# Patient Record
Sex: MALE | Race: WHITE | NOT HISPANIC OR LATINO | Employment: FULL TIME | URBAN - METROPOLITAN AREA
[De-identification: names, ages, dates, MRNs, and addresses within clinical notes are randomized per-mention and may not be internally consistent; named-entity substitution may affect disease eponyms.]

---

## 2017-01-06 ENCOUNTER — TELEPHONE (OUTPATIENT)
Dept: FAMILY MEDICINE CLINIC | Facility: CLINIC | Age: 37
End: 2017-01-06

## 2017-01-06 NOTE — TELEPHONE ENCOUNTER
ATTEMPTED TO CONTACT PT AND WIFE WITH NO ANSWER AND NO VM SET UP I FOUND OUT WHAT I NEEDED AND PAPER WORK WAS PLACED ON DR EVANS DESK TO COMPLETE SO WE CAN FAX BACK INFO.

## 2017-01-06 NOTE — TELEPHONE ENCOUNTER
----- Message from Luna Galo sent at 1/6/2017 11:25 AM EST -----  Contact: Whitfield  Patient's wife called and stated that someone is trying to call him but he is on the line at [facility]. Please contact wife at [phone].

## 2017-01-27 ENCOUNTER — TELEPHONE (OUTPATIENT)
Dept: FAMILY MEDICINE CLINIC | Facility: CLINIC | Age: 37
End: 2017-01-27

## 2017-01-27 NOTE — TELEPHONE ENCOUNTER
----- Message from Jacque Hermosillo sent at 1/27/2017  2:11 PM EST -----  Contact: EVANS WIFE RUY  WIFE CALLED TO ADVISE THAT HE HAS BEEN ON THE NEW INSULIN PUMP AND IT IS WORKING FINE AND HE IS FEELING BETTER HE IS GETTING BETTER AIC  WILL NEED A NEW SCRIPT FOR INSULIN SUPPLEMENTS FOR THE PUMP  PLEASE CALL WIFE RUY  [phone]

## 2017-01-27 NOTE — TELEPHONE ENCOUNTER
PLease call and find out which insulin he needs. Humalog vials or Novolog? bds  How much insulin does the new pump hold and how often does he have to fill it? bds

## 2017-01-27 NOTE — TELEPHONE ENCOUNTER
"Spoke with pt's wife who states the \"insulin\" nurse is sending out a new cartridge for pt for his new pump. States it has changed completely and they don't want Dr Whitfield to get confused later if they need something. Pt's wife is asking for a f/u appt with Dr Whitfield. Call transferred to front to schedule f/u appt.  "

## 2017-02-01 ENCOUNTER — TELEPHONE (OUTPATIENT)
Dept: FAMILY MEDICINE CLINIC | Facility: CLINIC | Age: 37
End: 2017-02-01

## 2017-02-01 NOTE — TELEPHONE ENCOUNTER
----- Message from Teresa Arriaza sent at 2/1/2017  3:37 PM EST -----  Contact: DR KENNY MED REFILL  PATIENT IS DR MOREL PATIENT AND HE HAS RAN OUT OF HIS TEST STRIPS. PATIENT WIFE IS REQUESTING THAT YOU SEND THEM ( ONE TOUCH VERIO TEST STRIPS) INTO [facility] IN [place] .  [phone]

## 2017-02-02 ENCOUNTER — TELEPHONE (OUTPATIENT)
Dept: FAMILY MEDICINE CLINIC | Facility: CLINIC | Age: 37
End: 2017-02-02

## 2017-02-02 NOTE — TELEPHONE ENCOUNTER
----- Message from Krys Forrest sent at 2/2/2017 10:59 AM EST -----  Contact: HELEN EVANS IS GOING TO GET A FORM FROM A PLACE FOR MEDICAL SUPPLIES FOR TEST STRIPS. SHE WASN'T SURE OF THE NAME.  THE INSURANCE WILL NOT PAY IF CALLED INTO A PHARMACY   SO THEY HAVE TO BE DONE THROUGH THIS COMPANY BECAUSE IT IS RELATED TO THE DIAB. PUMP      CALL WIFE FOR COMPLETE EXPLANATION.    [phone]

## 2017-02-06 ENCOUNTER — OFFICE VISIT (OUTPATIENT)
Dept: FAMILY MEDICINE CLINIC | Facility: CLINIC | Age: 37
End: 2017-02-06

## 2017-02-06 VITALS
RESPIRATION RATE: 16 BRPM | BODY MASS INDEX: 27.93 KG/M2 | HEART RATE: 94 BPM | OXYGEN SATURATION: 99 % | DIASTOLIC BLOOD PRESSURE: 88 MMHG | TEMPERATURE: 98 F | HEIGHT: 71 IN | SYSTOLIC BLOOD PRESSURE: 124 MMHG | WEIGHT: 199.5 LBS

## 2017-02-06 DIAGNOSIS — E10.65 UNCONTROLLED TYPE 1 DIABETES MELLITUS WITH HYPERGLYCEMIA (HCC): Primary | ICD-10-CM

## 2017-02-06 PROCEDURE — 99213 OFFICE O/P EST LOW 20 MIN: CPT | Performed by: FAMILY MEDICINE

## 2017-02-06 NOTE — PROGRESS NOTES
Chief Complaint   Patient presents with   • Diabetes   • Med Refill       Subjective     Diabetes   He presents for his follow-up diabetic visit. He has type 1 diabetes mellitus. His disease course has been stable. There are no hypoglycemic associated symptoms. Pertinent negatives for diabetes include no blurred vision, no chest pain, no fatigue, no visual change and no weight loss. There are no hypoglycemic complications. Symptoms are improving. There are no diabetic complications. Risk factors for coronary artery disease include dyslipidemia. Current diabetic treatment includes insulin pump. He is compliant with treatment all of the time. His weight is stable. He is following a generally healthy diet. Frequency home blood tests: Has continuous glucose. (Reviewed readings from continuous glucose monitor.  Has had only for the last 2 weeks.  Blood sugars ranging up to 300 but overall average around 190-200.) An ACE inhibitor/angiotensin II receptor blocker is not being taken. Eye exam is not current.       DM I, insulin pump    Has new pump and CGM    BS running: ave 192 with elevations middle of night, occ low    Basal Rate: 1.55 per hour 24 hour period.     CGM calibrates 2 times per day    Need strips.   One touch verio.         Past Medical History,Medications, Allergies, and social history was reviewed.    Review of Systems   Constitutional: Negative.  Negative for fatigue and weight loss.   HENT: Negative.    Eyes: Negative.  Negative for blurred vision.   Respiratory: Negative.    Cardiovascular: Negative.  Negative for chest pain.   Gastrointestinal: Negative.    Endocrine: Negative.    Genitourinary: Negative.    Musculoskeletal: Negative.    Neurological: Negative.    Psychiatric/Behavioral: Negative.        Objective     Physical Exam   Constitutional: He is oriented to person, place, and time. He appears well-developed and well-nourished.   HENT:   Head: Normocephalic and atraumatic.   Right Ear:  External ear normal.   Left Ear: External ear normal.   Eyes: EOM are normal. Pupils are equal, round, and reactive to light.   Pulmonary/Chest: Effort normal.   Neurological: He is alert and oriented to person, place, and time.   Skin: Skin is warm and dry.   Psychiatric: He has a normal mood and affect. His behavior is normal.   Nursing note and vitals reviewed.    Insulin pump is in place on right side of abdomen and he continues glucose monitor on the left abdomen    Assessment/Plan     Problem List Items Addressed This Visit        Endocrine    Uncontrolled type 1 diabetes mellitus - Primary              DISCUSSION  Cont insulin pump and continuous glucose monitoring.  In 2 weeks, will review further and see if there are patterns of how high he is.  Right now he is using 1.55 units of basal insulin per hour.  I suspect we will need to adjust that but we will get him more attuned to the current regimen and change from there.    He is agreeable.    REFILLED TEST STRIPS TO CHECK 3 TIMES A DAY #200 FAX to 1-3 3 0-4 8 7-1 410    MEDICATIONS PRESCRIBED  Requested Prescriptions      No prescriptions requested or ordered in this encounter          Carlitos Whitfield MD

## 2017-02-21 ENCOUNTER — TELEPHONE (OUTPATIENT)
Dept: FAMILY MEDICINE CLINIC | Facility: CLINIC | Age: 37
End: 2017-02-21

## 2017-02-21 NOTE — TELEPHONE ENCOUNTER
Please call.  Given the increased blood sugars during the night, recommend increasing the basal rate to 1.6 from 9 PM until 7 AM and then resume the 1.55 between 7 AM and 9 PM.  Of course, continue to monitor blood sugar.  If any low sugars, please let me know and I will check again in 1 month for levels.  Call sooner though if it is not decreasing much.

## 2017-03-27 ENCOUNTER — TELEPHONE (OUTPATIENT)
Dept: FAMILY MEDICINE CLINIC | Facility: CLINIC | Age: 37
End: 2017-03-27

## 2017-03-27 NOTE — TELEPHONE ENCOUNTER
Please call patient.  I reviewed his blood sugars from February 21 to March 27.  It appears that he is having highs consistently between 8 PM until 2 AM.  I would like for him to start the basal rate of 1.6 at 8 PM instead of 9 PM continue until 7 AM.  Then 1.55 basal rate 7 AM until 8 PM during the day.  On average, his blood sugar has decreased from 212-192 so looking better.  If he is having any significant lows in the middle the night, please let me know.  It appears he had a few on reviewed.  Let me know how he is doing.

## 2017-03-29 NOTE — TELEPHONE ENCOUNTER
LVM FOR PT TO RETURN CALL. OFFICE HRS AND NUMBER GIVEN.   LVM FOR WIFE TO RETURN CALL, OFFICE HRS AND NUMBER GIVEN.

## 2017-03-30 ENCOUNTER — TELEPHONE (OUTPATIENT)
Dept: FAMILY MEDICINE CLINIC | Facility: CLINIC | Age: 37
End: 2017-03-30

## 2017-03-30 NOTE — TELEPHONE ENCOUNTER
SPOKE WITH WIFE AND INFORMED HER OF MESSAGE AND WIFE STATES THAT PT CENSORS WERE FAULTY AND THEY HAD TO SEND NEW ONES. THE LOW READINGS YOU WERE SEEING WERE NOT ACCURATE AND WIFE VERBALIZED UNDERSTANDING.

## 2017-03-30 NOTE — TELEPHONE ENCOUNTER
----- Message from Manda Alvarez sent at 3/29/2017  1:11 PM EDT -----  Contact: DR. EVANS; SHIV Frye wife turned your call regarding kolton cedeno. His sensors has been messed up and the company has shipped him some new one.       CALL BACK   [phone]

## 2017-04-21 ENCOUNTER — TELEPHONE (OUTPATIENT)
Dept: FAMILY MEDICINE CLINIC | Facility: CLINIC | Age: 37
End: 2017-04-21

## 2017-04-21 NOTE — TELEPHONE ENCOUNTER
LVM FOR PT THAT I SPOKE WITH PHARM AND THEY STATE THAT NOVA LOG IS COVERED HE JUST HAS A HIGH CO PAY ON IT. THEY SAID FOR PT TO CALL INS. AND SEE IF THERE WAS ANOTHER ONE CHEAPER. SPOKE WITH WIFE AND INFORMED HER OF THE SAME THING AND THAT I HAVE A COUPLE SAMPLES AND HE OR SHE CAN PU BEFORE 5 PM TODAY. WIFE VERBALIZED UNDERSTANDING.

## 2017-04-21 NOTE — TELEPHONE ENCOUNTER
----- Message from Jacque Hermosillo sent at 4/20/2017  2:31 PM EDT -----  Contact: MIKE SHAW  PATIENT IS NEEDING A SAMPLE OF THE NOVALOG HE CANNOT AFFORD TO SCRIPT PLEASE CALL HIM   [phone]

## 2017-04-21 NOTE — TELEPHONE ENCOUNTER
PLease call, not sure if we have samples. ? insurance not covering. Ok for samples if we have but it is for his pump. Please clarify with pharmacy as well on coverage. bds

## 2017-05-01 ENCOUNTER — TELEPHONE (OUTPATIENT)
Dept: FAMILY MEDICINE CLINIC | Facility: CLINIC | Age: 37
End: 2017-05-01

## 2017-05-02 ENCOUNTER — TELEPHONE (OUTPATIENT)
Dept: FAMILY MEDICINE CLINIC | Facility: CLINIC | Age: 37
End: 2017-05-02

## 2017-06-08 ENCOUNTER — OFFICE VISIT (OUTPATIENT)
Dept: FAMILY MEDICINE CLINIC | Facility: CLINIC | Age: 37
End: 2017-06-08

## 2017-06-08 VITALS
WEIGHT: 206 LBS | HEIGHT: 71 IN | HEART RATE: 102 BPM | TEMPERATURE: 97.8 F | BODY MASS INDEX: 28.84 KG/M2 | DIASTOLIC BLOOD PRESSURE: 84 MMHG | OXYGEN SATURATION: 98 % | RESPIRATION RATE: 16 BRPM | SYSTOLIC BLOOD PRESSURE: 118 MMHG

## 2017-06-08 DIAGNOSIS — E10.65 UNCONTROLLED TYPE 1 DIABETES MELLITUS WITH HYPERGLYCEMIA (HCC): Primary | ICD-10-CM

## 2017-06-08 DIAGNOSIS — E78.2 MIXED HYPERLIPIDEMIA: ICD-10-CM

## 2017-06-08 PROCEDURE — 99213 OFFICE O/P EST LOW 20 MIN: CPT | Performed by: FAMILY MEDICINE

## 2017-06-08 NOTE — PROGRESS NOTES
"  Chief Complaint   Patient presents with   • Diabetes   • Labs Only   • Med Refill       Subjective     Diabetes   He presents for his follow-up diabetic visit. He has type 1 diabetes mellitus. His disease course has been stable. There are no hypoglycemic associated symptoms. Associated symptoms include fatigue. Pertinent negatives for diabetes include no blurred vision, no chest pain, no visual change and no weight loss. There are no hypoglycemic complications. Symptoms are improving. There are no diabetic complications. Risk factors for coronary artery disease include dyslipidemia. Current diabetic treatment includes insulin pump. He is compliant with treatment all of the time. His weight is stable. He is following a generally healthy diet. Frequency home blood tests: Has continuous glucose. (Occ get low. 230 range. 40s after work every day. Snacks during breaks) An ACE inhibitor/angiotensin II receptor blocker is not being taken. Eye exam is not current.       Past Medical History,Medications, Allergies, and social history was reviewed.    Review of Systems   Constitutional: Positive for fatigue. Negative for fever and weight loss.   HENT: Negative.    Eyes: Negative for blurred vision.   Respiratory: Negative.    Cardiovascular: Negative.  Negative for chest pain.   Gastrointestinal: Negative.    Genitourinary: Negative.    Musculoskeletal: Negative.    Neurological: Negative.    Psychiatric/Behavioral: Negative.        Objective     Vitals:    06/08/17 1618   BP: 118/84   Pulse: 102   Resp: 16   Temp: 97.8 °F (36.6 °C)   TempSrc: Temporal Artery    SpO2: 98%   Weight: 206 lb (93.4 kg)   Height: 71\" (180.3 cm)        Physical Exam   Constitutional: He is oriented to person, place, and time. He appears well-developed and well-nourished.   HENT:   Head: Normocephalic and atraumatic.   Right Ear: External ear normal.   Left Ear: External ear normal.   Eyes: EOM are normal. Pupils are equal, round, and reactive to " light.   Cardiovascular: Normal rate, regular rhythm and normal heart sounds.    Pulmonary/Chest: Effort normal and breath sounds normal. No respiratory distress. He has no wheezes. He has no rales.   Musculoskeletal: He exhibits no edema.   Neurological: He is alert and oriented to person, place, and time.   Skin: Skin is warm and dry.   Psychiatric: He has a normal mood and affect. His behavior is normal.   Nursing note and vitals reviewed.        Assessment/Plan     Problem List Items Addressed This Visit        Cardiovascular and Mediastinum    Hyperlipidemia    Relevant Orders    Lipid Panel With / Chol / HDL Ratio       Endocrine    Uncontrolled type 1 diabetes mellitus - Primary    Relevant Orders    Comprehensive Metabolic Panel    Hemoglobin A1c              DISCUSSION  Check labs as noted for follow-up of diabetes and hyperlipidemia.  We will check his readings on line for his insulin pump to see if we need to changing further.  He has been having some low blood sugars after work.    Reviewed his readings.  Average blood sugar 195.  He did have some low blood sugars.  We will see what A1c is and then make further adjustments.    Further plan once we have labs back.        MEDICATIONS PRESCRIBED  Requested Prescriptions      No prescriptions requested or ordered in this encounter          Carlitos Whitfield MD

## 2017-06-09 LAB
ALBUMIN SERPL-MCNC: 4.5 G/DL (ref 3.2–4.8)
ALBUMIN/GLOB SERPL: 1.5 G/DL (ref 1.5–2.5)
ALP SERPL-CCNC: 87 U/L (ref 25–100)
ALT SERPL-CCNC: 17 U/L (ref 7–40)
AST SERPL-CCNC: 16 U/L (ref 0–33)
BILIRUB SERPL-MCNC: 0.4 MG/DL (ref 0.3–1.2)
BUN SERPL-MCNC: 18 MG/DL (ref 9–23)
BUN/CREAT SERPL: 20 (ref 7–25)
CALCIUM SERPL-MCNC: 10.3 MG/DL (ref 8.7–10.4)
CHLORIDE SERPL-SCNC: 105 MMOL/L (ref 99–109)
CHOLEST SERPL-MCNC: 196 MG/DL (ref 0–200)
CHOLEST/HDLC SERPL: 4.67 {RATIO}
CO2 SERPL-SCNC: 27 MMOL/L (ref 20–31)
CREAT SERPL-MCNC: 0.9 MG/DL (ref 0.6–1.3)
GLOBULIN SER CALC-MCNC: 3 GM/DL
GLUCOSE SERPL-MCNC: 176 MG/DL (ref 70–100)
HBA1C MFR BLD: 7.5 % (ref 4.8–5.6)
HDLC SERPL-MCNC: 42 MG/DL (ref 40–60)
LDLC SERPL CALC-MCNC: 129 MG/DL (ref 0–100)
POTASSIUM SERPL-SCNC: 4.6 MMOL/L (ref 3.5–5.5)
PROT SERPL-MCNC: 7.5 G/DL (ref 5.7–8.2)
SODIUM SERPL-SCNC: 139 MMOL/L (ref 132–146)
TRIGL SERPL-MCNC: 125 MG/DL (ref 0–150)
VLDLC SERPL CALC-MCNC: 25 MG/DL

## 2017-11-28 ENCOUNTER — TELEPHONE (OUTPATIENT)
Dept: FAMILY MEDICINE CLINIC | Facility: CLINIC | Age: 37
End: 2017-11-28

## 2017-11-28 NOTE — TELEPHONE ENCOUNTER
Please call patient. Need to know how much Novolog insulin he is doing per day now. How many units on average and max amount.     Also, due for office visit and labs.     Carlitos Whitfield MD

## 2017-11-28 NOTE — TELEPHONE ENCOUNTER
Spoke with pt he was unsure he just knows his pumps holds 300u and he changes it out every three days. He is aware he needs an o/v

## 2017-11-28 NOTE — TELEPHONE ENCOUNTER
[facility] FROM  PHARM CALLING AND THEY ARE BEING AUDITED AND NEED A NEW RX WITH SPECIFIC INSTRUCTIONS ON NOVA LOG FROM 7/18/2016 ON LETTER HEAD OR ON A RX THEY WANT PHYSICIAN TO ADD A MAX DAILY DOSE ON THIS AS WELL.  FAX [phone]

## 2017-12-12 ENCOUNTER — OFFICE VISIT (OUTPATIENT)
Dept: FAMILY MEDICINE CLINIC | Facility: CLINIC | Age: 37
End: 2017-12-12

## 2017-12-12 VITALS
HEART RATE: 94 BPM | RESPIRATION RATE: 16 BRPM | TEMPERATURE: 97.8 F | BODY MASS INDEX: 33.32 KG/M2 | DIASTOLIC BLOOD PRESSURE: 98 MMHG | WEIGHT: 238 LBS | HEIGHT: 71 IN | SYSTOLIC BLOOD PRESSURE: 120 MMHG | OXYGEN SATURATION: 98 %

## 2017-12-12 DIAGNOSIS — E78.2 MIXED HYPERLIPIDEMIA: ICD-10-CM

## 2017-12-12 DIAGNOSIS — E10.65 UNCONTROLLED TYPE 1 DIABETES MELLITUS WITH HYPERGLYCEMIA (HCC): Primary | ICD-10-CM

## 2017-12-12 LAB
ALBUMIN SERPL-MCNC: 4.4 G/DL (ref 3.2–4.8)
ALBUMIN/GLOB SERPL: 1.6 G/DL (ref 1.5–2.5)
ALP SERPL-CCNC: 98 U/L (ref 25–100)
ALT SERPL-CCNC: 28 U/L (ref 7–40)
AST SERPL-CCNC: 22 U/L (ref 0–33)
BILIRUB SERPL-MCNC: 0.4 MG/DL (ref 0.3–1.2)
BUN SERPL-MCNC: 15 MG/DL (ref 9–23)
BUN/CREAT SERPL: 16.7 (ref 7–25)
CALCIUM SERPL-MCNC: 9.5 MG/DL (ref 8.7–10.4)
CHLORIDE SERPL-SCNC: 102 MMOL/L (ref 99–109)
CHOLEST SERPL-MCNC: 187 MG/DL (ref 0–200)
CHOLEST/HDLC SERPL: 4.79 {RATIO}
CO2 SERPL-SCNC: 28 MMOL/L (ref 20–31)
CREAT SERPL-MCNC: 0.9 MG/DL (ref 0.6–1.3)
GFR SERPLBLD CREATININE-BSD FMLA CKD-EPI: 115 ML/MIN/1.73
GFR SERPLBLD CREATININE-BSD FMLA CKD-EPI: 95 ML/MIN/1.73
GLOBULIN SER CALC-MCNC: 2.7 GM/DL
GLUCOSE SERPL-MCNC: 162 MG/DL (ref 70–100)
HBA1C MFR BLD: 9 % (ref 4.8–5.6)
HDLC SERPL-MCNC: 39 MG/DL (ref 40–60)
LDLC SERPL CALC-MCNC: 123 MG/DL (ref 0–100)
POTASSIUM SERPL-SCNC: 4.1 MMOL/L (ref 3.5–5.5)
PROT SERPL-MCNC: 7.1 G/DL (ref 5.7–8.2)
SODIUM SERPL-SCNC: 136 MMOL/L (ref 132–146)
TRIGL SERPL-MCNC: 126 MG/DL (ref 0–150)
VLDLC SERPL CALC-MCNC: 25.2 MG/DL

## 2017-12-12 PROCEDURE — 99213 OFFICE O/P EST LOW 20 MIN: CPT | Performed by: FAMILY MEDICINE

## 2018-01-11 ENCOUNTER — TELEPHONE (OUTPATIENT)
Dept: FAMILY MEDICINE CLINIC | Facility: CLINIC | Age: 38
End: 2018-01-11

## 2018-01-11 DIAGNOSIS — Z20.828 EXPOSURE TO THE FLU: Primary | ICD-10-CM

## 2018-01-11 RX ORDER — OSELTAMIVIR PHOSPHATE 75 MG/1
75 CAPSULE ORAL DAILY
Qty: 10 CAPSULE | Refills: 0 | Status: SHIPPED | OUTPATIENT
Start: 2018-01-11 | End: 2018-02-27

## 2018-01-11 NOTE — TELEPHONE ENCOUNTER
----- Message from Thalia Gonzalez sent at 1/11/2018 12:14 PM EST -----  Contact: rosi, patient wife  Pt son was diagnosed with Flu B, should Robert get Tamiflu since he is diabetic or wait? Robert has also not gotten the flu shot is it too late? [facility] [place], [phone] may leave a message

## 2018-01-11 NOTE — TELEPHONE ENCOUNTER
Please call,    1. Never too late to get flu shot but we are out of them so would need to get at pharmacy.    2. Yes, would rec Tamiflu and I will send in. Takes once per day for 10 days unless gets ill then one twice per day for 5 days

## 2018-01-11 NOTE — TELEPHONE ENCOUNTER
LVM FOR PT ON PHONE AND SPOKE WITH WIFE AND INFORMED HER OF THIS AND WIFE VERBALIZED UNDERSTANDING.

## 2018-02-19 ENCOUNTER — TELEPHONE (OUTPATIENT)
Dept: FAMILY MEDICINE CLINIC | Facility: CLINIC | Age: 38
End: 2018-02-19

## 2018-02-19 NOTE — TELEPHONE ENCOUNTER
----- Message from Manda Alvarez sent at 2/19/2018  3:15 PM EST -----  Contact: NATHAN; [facility] QUESTION/ MED QUESTION  PT FATHER WAS PASSING AWAY AND THEY HAD CALLED THE FAMILY IN AND THEY WANTED TO KNOW IF DR. EVANS WOULD BE ABLE TO FRILL OUT THE [facility] PAPER WORK.     SHE ALSO WANTED TO KNOW IF A PERSONS BODY CAN BE COME ADJUSTED TO THE INSULIN.     CALL BACK   [phone]

## 2018-02-19 NOTE — TELEPHONE ENCOUNTER
Okay for [place] paperwork but just need to have dates involved that he has missed.    The question about the insulin is difficult to answer there are lots of other factors including stress, diet changes, that may affect the insulin.

## 2018-02-21 NOTE — TELEPHONE ENCOUNTER
LVM FOR RETURN CALL,OFFICE HRS AND NUMBER GIVEN. SPOKE WITH PT WIFE AND INFORMED HER OF MESSAGE AND SHE WILL HAVE PT WRITE DOWN EVERY THING DR NEEDS AND BRING IT IN TO YOU

## 2018-02-27 ENCOUNTER — OFFICE VISIT (OUTPATIENT)
Dept: FAMILY MEDICINE CLINIC | Facility: CLINIC | Age: 38
End: 2018-02-27

## 2018-02-27 VITALS
SYSTOLIC BLOOD PRESSURE: 132 MMHG | OXYGEN SATURATION: 98 % | HEART RATE: 94 BPM | BODY MASS INDEX: 30.24 KG/M2 | RESPIRATION RATE: 16 BRPM | HEIGHT: 71 IN | DIASTOLIC BLOOD PRESSURE: 96 MMHG | TEMPERATURE: 97.8 F | WEIGHT: 216 LBS

## 2018-02-27 DIAGNOSIS — E10.65 UNCONTROLLED TYPE 1 DIABETES MELLITUS WITH HYPERGLYCEMIA (HCC): Primary | ICD-10-CM

## 2018-02-27 DIAGNOSIS — E78.2 MIXED HYPERLIPIDEMIA: ICD-10-CM

## 2018-02-27 PROCEDURE — 99213 OFFICE O/P EST LOW 20 MIN: CPT | Performed by: FAMILY MEDICINE

## 2018-02-27 NOTE — PROGRESS NOTES
Assessment/Plan     Problem List Items Addressed This Visit        Cardiovascular and Mediastinum    Hyperlipidemia    Relevant Orders    Comprehensive Metabolic Panel    Lipid Panel With / Chol / HDL Ratio       Endocrine    Uncontrolled type 1 diabetes mellitus - Primary    Relevant Orders    Comprehensive Metabolic Panel    Lipid Panel With / Chol / HDL Ratio    Hemoglobin A1c           Follow up: No Follow-up on file.     DISCUSSION  Check labs as noted.  Further plan once we have labs back in the also get me his Dexcom code so I can review his blood sugars over the last 90 days.  Will make determination on next step based on that and A1c.    He agrees.          MEDICATIONS PRESCRIBED  Requested Prescriptions      No prescriptions requested or ordered in this encounter        -------------------------------------------    Subjective     Chief Complaint   Patient presents with   • Diabetes     3 month follow up   • Med Refill   • Labs Only       HPI    Increased stress  Dtr with surg  Dad + hospital, age 84., out now.   Hard to breath, + shortness of breath, bile duct, + stones in it.   Turned blue during surg, blood clots  He is home now  Tried to assisted living but would not go        DM 1    Basal rates 1.6  8 - 12 pm  and 1.55 all other times    85 sugar right now  One day had used 100 units in one day    Highest was 210 at 7:46 am    Catheter had popped loose one day    Overall since stress has improved, he is feeling better and feels like his sugars are improving.    Feels less stressed            Past Medical History,Medications, Allergies, and social history was reviewed.    Review of Systems   Constitutional: Negative.    HENT: Negative.    Respiratory: Negative.    Cardiovascular: Negative.    Gastrointestinal: Negative.    Musculoskeletal: Negative.    Psychiatric/Behavioral: The patient is nervous/anxious.        Objective     Vitals:    02/27/18 1632   BP: 132/96   Pulse: 94   Resp: 16   Temp:  "97.8 °F (36.6 °C)   TempSrc: Temporal Artery    SpO2: 98%   Weight: 98 kg (216 lb)   Height: 180.3 cm (70.98\")        Physical Exam   Constitutional: He is oriented to person, place, and time. Vital signs are normal. He appears well-developed and well-nourished.   HENT:   Head: Normocephalic and atraumatic.   Right Ear: Hearing, tympanic membrane, external ear and ear canal normal.   Left Ear: Hearing, tympanic membrane, external ear and ear canal normal.   Nose: Nose normal.   Mouth/Throat: Oropharynx is clear and moist.   Eyes: Conjunctivae, EOM and lids are normal. Pupils are equal, round, and reactive to light.   Neck: Normal range of motion. Neck supple. No thyromegaly present.   Cardiovascular: Normal rate, regular rhythm and normal heart sounds.  Exam reveals no friction rub.    No murmur heard.  Pulmonary/Chest: Effort normal and breath sounds normal. No respiratory distress. He has no wheezes. He has no rales.   Abdominal: Soft. Normal appearance.   Musculoskeletal: He exhibits no edema.   Neurological: He is alert and oriented to person, place, and time. He has normal strength.   Skin: Skin is warm and dry.   Psychiatric: He has a normal mood and affect. His speech is normal and behavior is normal. Cognition and memory are normal.   Nursing note and vitals reviewed.              Carlitos Whitfield MD    "

## 2018-02-28 DIAGNOSIS — E10.65 UNCONTROLLED TYPE 1 DIABETES MELLITUS WITH HYPERGLYCEMIA (HCC): Primary | ICD-10-CM

## 2018-02-28 LAB
ALBUMIN SERPL-MCNC: 4.7 G/DL (ref 3.2–4.8)
ALBUMIN/GLOB SERPL: 1.7 G/DL (ref 1.5–2.5)
ALP SERPL-CCNC: 103 U/L (ref 25–100)
ALT SERPL-CCNC: 54 U/L (ref 7–40)
AST SERPL-CCNC: 29 U/L (ref 0–33)
BILIRUB SERPL-MCNC: 0.5 MG/DL (ref 0.3–1.2)
BUN SERPL-MCNC: 17 MG/DL (ref 9–23)
BUN/CREAT SERPL: 18.9 (ref 7–25)
CALCIUM SERPL-MCNC: 9.7 MG/DL (ref 8.7–10.4)
CHLORIDE SERPL-SCNC: 101 MMOL/L (ref 99–109)
CHOLEST SERPL-MCNC: 227 MG/DL (ref 0–200)
CHOLEST/HDLC SERPL: 5.04 {RATIO}
CO2 SERPL-SCNC: 29 MMOL/L (ref 20–31)
CREAT SERPL-MCNC: 0.9 MG/DL (ref 0.6–1.3)
GFR SERPLBLD CREATININE-BSD FMLA CKD-EPI: 114 ML/MIN/1.73
GFR SERPLBLD CREATININE-BSD FMLA CKD-EPI: 94 ML/MIN/1.73
GLOBULIN SER CALC-MCNC: 2.8 GM/DL
GLUCOSE SERPL-MCNC: 59 MG/DL (ref 70–100)
HBA1C MFR BLD: 8.2 % (ref 4.8–5.6)
HDLC SERPL-MCNC: 45 MG/DL (ref 40–60)
LDLC SERPL CALC-MCNC: 161 MG/DL (ref 0–100)
POTASSIUM SERPL-SCNC: 4.4 MMOL/L (ref 3.5–5.5)
PROT SERPL-MCNC: 7.5 G/DL (ref 5.7–8.2)
SODIUM SERPL-SCNC: 138 MMOL/L (ref 132–146)
TRIGL SERPL-MCNC: 104 MG/DL (ref 0–150)
VLDLC SERPL CALC-MCNC: 20.8 MG/DL

## 2018-06-01 ENCOUNTER — TELEPHONE (OUTPATIENT)
Dept: FAMILY MEDICINE CLINIC | Facility: CLINIC | Age: 38
End: 2018-06-01

## 2018-06-01 NOTE — TELEPHONE ENCOUNTER
Please call this number and okay to refill this.  Previously we have received faxes and I am not comfortable E prescribing in case they need more information.

## 2018-06-01 NOTE — TELEPHONE ENCOUNTER
"Called the company and gave verbal order, however they said, it was after 5:00pm and nothing they could do until Monday. They will fax over the order for you to sign on Monday. I called pt's wife and informed her of this. She said, \"tell Dr. Whitfield there will not be any glucose readings until Monday then.\" (her words)  "

## 2018-06-01 NOTE — TELEPHONE ENCOUNTER
----- Message from Teresa Arriaza sent at 6/1/2018  4:49 PM EDT -----  Contact: DR EVANS MED REFILL  MED REFILL ON Continuous Glucose Monitor (DEXCOM G5 MOBILE ) kit TO FANNY (THER PHONE NUMBER IS [phone]).  [phone]  PATIENT IS COMPLETELY OUT

## 2018-07-19 ENCOUNTER — OFFICE VISIT (OUTPATIENT)
Dept: FAMILY MEDICINE CLINIC | Facility: CLINIC | Age: 38
End: 2018-07-19

## 2018-07-19 VITALS
BODY MASS INDEX: 29.96 KG/M2 | TEMPERATURE: 98.4 F | RESPIRATION RATE: 20 BRPM | HEART RATE: 80 BPM | DIASTOLIC BLOOD PRESSURE: 90 MMHG | HEIGHT: 71 IN | SYSTOLIC BLOOD PRESSURE: 124 MMHG | WEIGHT: 214 LBS

## 2018-07-19 DIAGNOSIS — E10.65 UNCONTROLLED TYPE 1 DIABETES MELLITUS WITH HYPERGLYCEMIA (HCC): Primary | ICD-10-CM

## 2018-07-19 DIAGNOSIS — R40.0 INTERMITTENT DROWSINESS: ICD-10-CM

## 2018-07-19 DIAGNOSIS — R06.83 SNORING: ICD-10-CM

## 2018-07-19 DIAGNOSIS — R29.818 SUSPECTED SLEEP APNEA: ICD-10-CM

## 2018-07-19 DIAGNOSIS — E78.2 MIXED HYPERLIPIDEMIA: ICD-10-CM

## 2018-07-19 LAB
ALBUMIN SERPL-MCNC: 4.44 G/DL (ref 3.2–4.8)
ALBUMIN/GLOB SERPL: 1.5 G/DL (ref 1.5–2.5)
ALP SERPL-CCNC: 108 U/L (ref 25–100)
ALT SERPL-CCNC: 49 U/L (ref 7–40)
AST SERPL-CCNC: 29 U/L (ref 0–33)
BILIRUB SERPL-MCNC: 1 MG/DL (ref 0.3–1.2)
BUN SERPL-MCNC: 18 MG/DL (ref 9–23)
BUN/CREAT SERPL: 16.2 (ref 7–25)
CALCIUM SERPL-MCNC: 9.5 MG/DL (ref 8.7–10.4)
CHLORIDE SERPL-SCNC: 103 MMOL/L (ref 99–109)
CHOLEST SERPL-MCNC: 222 MG/DL (ref 0–200)
CHOLEST/HDLC SERPL: 5.69 {RATIO}
CO2 SERPL-SCNC: 29 MMOL/L (ref 20–31)
CREAT SERPL-MCNC: 1.11 MG/DL (ref 0.6–1.3)
GLOBULIN SER CALC-MCNC: 3.1 GM/DL
GLUCOSE SERPL-MCNC: 257 MG/DL (ref 70–100)
HBA1C MFR BLD: 8.7 % (ref 4.8–5.6)
HDLC SERPL-MCNC: 39 MG/DL (ref 40–60)
LDLC SERPL CALC-MCNC: 164 MG/DL (ref 0–100)
POTASSIUM SERPL-SCNC: 4.8 MMOL/L (ref 3.5–5.5)
PROT SERPL-MCNC: 7.5 G/DL (ref 5.7–8.2)
SODIUM SERPL-SCNC: 137 MMOL/L (ref 132–146)
TRIGL SERPL-MCNC: 95 MG/DL (ref 0–150)
VLDLC SERPL CALC-MCNC: 19 MG/DL

## 2018-07-19 PROCEDURE — 99214 OFFICE O/P EST MOD 30 MIN: CPT | Performed by: FAMILY MEDICINE

## 2018-07-19 NOTE — PROGRESS NOTES
Assessment/Plan       Problems Addressed this Visit        Cardiovascular and Mediastinum    Hyperlipidemia    Relevant Orders    Comprehensive Metabolic Panel    Lipid Panel With / Chol / HDL Ratio       Endocrine    Uncontrolled type 1 diabetes mellitus (CMS/[facility]) - Primary    Relevant Orders    Comprehensive Metabolic Panel    Lipid Panel With / Chol / HDL Ratio    Hemoglobin A1c      Other Visit Diagnoses     Snoring        Relevant Orders    Home Sleep Study    Suspected sleep apnea        Relevant Orders    Home Sleep Study    Intermittent drowsiness        Relevant Orders    Home Sleep Study            Follow up: Return if symptoms worsen or fail to improve, for follow up depends on review of labs and testing.     DISCUSSION  Uncontrolled diabetes mellitus type 1 on insulin pump.  Reviewed Dexcom  results online.  Showed persistent elevation between 8 PM and 1 AM.  Recommend Increase basal to 1.65 8 om to 12 am and 1.55 the rest of the time.  Continue to monitor and will check labs as well.    Suspected sleep apnea with snoring and drowsiness.  [place] sleepiness scale was significantly elevated with a score of 18.  Check home sleep study.    Hyperlipidemia.  Is not currently taking a statin medication.  Had sensitivity to pravastatin.  Recheck lipid panel.      MEDICATIONS PRESCRIBED  Requested Prescriptions      No prescriptions requested or ordered in this encounter            -------------------------------------------    Subjective     Chief Complaint   Patient presents with   • Snoring     with apnea. His wife has noticed this and would like for him to get a sleep study         Diabetes   He presents for his follow-up diabetic visit. He has type 1 diabetes mellitus. His disease course has been stable. There are no hypoglycemic associated symptoms. Associated symptoms include fatigue. Pertinent negatives for diabetes include no weight loss. Hypoglycemia complications include nocturnal hypoglycemia  "(occ). There are no diabetic complications. Risk factors for coronary artery disease include dyslipidemia. Current diabetic treatment includes insulin pump. He is compliant with treatment all of the time. His weight is stable. He is following a generally unhealthy diet. There is no change (reviewed trend) in his home blood glucose trend. An ACE inhibitor/angiotensin II receptor blocker is being taken. Eye exam is current.       Snoring  Wife states he stop breathing and then restart  Drowsiness at home  Wakes up startled  Occ headaches in am  Sx > 1 yr        FH: no sleep apnea      1.55 basal rate   1.6   8 pm at 12 am     History   Smoking Status   • Former Smoker   • Quit date: 2005   Smokeless Tobacco   • Current User   • Types: Snuff        Past Medical History,Medications, Allergies, and social history was reviewed.      Review of Systems   Constitutional: Positive for fatigue. Negative for unexpected weight gain and unexpected weight loss.   HENT: Negative.    Respiratory: Negative.    Cardiovascular: Negative.    Gastrointestinal: Negative.    Musculoskeletal: Negative.    Psychiatric/Behavioral: Negative.        Objective     Vitals:    07/19/18 1623   BP: 124/90   Pulse: 80   Resp: 20   Temp: 98.4 °F (36.9 °C)   Weight: 97.1 kg (214 lb)   Height: 180.3 cm (71\")          Physical Exam   Constitutional: Vital signs are normal. He appears well-developed and well-nourished.   HENT:   Head: Normocephalic and atraumatic.   Right Ear: Hearing, tympanic membrane, external ear and ear canal normal.   Left Ear: Hearing, tympanic membrane, external ear and ear canal normal.   Mouth/Throat: Oropharynx is clear and moist.   Eyes: Pupils are equal, round, and reactive to light. Conjunctivae, EOM and lids are normal.   Neck: Normal range of motion. Neck supple. No thyromegaly present.   Cardiovascular: Normal rate, regular rhythm and normal heart sounds.  Exam reveals no friction rub.    No murmur " heard.  Pulmonary/Chest: Effort normal and breath sounds normal. No respiratory distress. He has no wheezes. He has no rales.   Abdominal: Soft. Normal appearance and bowel sounds are normal. He exhibits no distension and no mass. There is no tenderness.   Musculoskeletal: He exhibits no edema.   Neurological: He is alert. He has normal strength.   Skin: Skin is warm and dry.   Psychiatric: He has a normal mood and affect. His speech is normal and behavior is normal. Cognition and memory are normal.   Nursing note and vitals reviewed.                Carlitos Whitfield MD

## 2018-08-06 ENCOUNTER — TELEPHONE (OUTPATIENT)
Dept: FAMILY MEDICINE CLINIC | Facility: CLINIC | Age: 38
End: 2018-08-06

## 2018-08-06 NOTE — TELEPHONE ENCOUNTER
----- Message from Krys Forrest sent at 8/6/2018  1:23 PM EDT -----  Contact: NATHAN  [facility] NEEDS THE PATIENT IN FOR THE 90 MINUTE APPT. TO SET UP FOR HIS CPAP. WHICH HE REALLY NEEDS.  HE WOULD HAVE TO TAKE OFF WORK IN ORDER TO DO THIS. CAN YOU FILL OUT THE PAPERWORK FOR HIM IF HE DOES THIS.     [phone]

## 2018-08-07 ENCOUNTER — TELEPHONE (OUTPATIENT)
Dept: FAMILY MEDICINE CLINIC | Facility: CLINIC | Age: 38
End: 2018-08-07

## 2018-08-07 NOTE — TELEPHONE ENCOUNTER
----- Message from Manda Alvarez sent at 8/7/2018 11:24 AM EDT -----  Contact: NATHAN; ORDER NEEDED   PT WOULD LIKE TO KNOW IF A SLEEP APNEA MACHINE COULD BE WRITTEN TO HAVE HIM PICK IT UP AT [facility]?      CALL BACK   [phone]

## 2018-08-29 ENCOUNTER — TELEPHONE (OUTPATIENT)
Dept: FAMILY MEDICINE CLINIC | Facility: CLINIC | Age: 38
End: 2018-08-29

## 2018-08-29 NOTE — TELEPHONE ENCOUNTER
----- Message from Donnie Ugarte sent at 8/29/2018 11:13 AM EDT -----  Contact: SHIV LEON (PT'S WIFE) [phone]  PT WAS WANTING TO KNOW IF DR. EVANS HAD COMPLETED [facility] PAPERWORK WOULD LIKE CALL BACK   SHIV LEON (PT'S WIFE)   [phone]

## 2018-09-04 PROBLEM — G47.33 OSA (OBSTRUCTIVE SLEEP APNEA): Status: ACTIVE | Noted: 2018-09-04

## 2018-12-13 ENCOUNTER — TELEPHONE (OUTPATIENT)
Dept: FAMILY MEDICINE CLINIC | Facility: CLINIC | Age: 38
End: 2018-12-13

## 2018-12-13 DIAGNOSIS — E10.65 UNCONTROLLED TYPE 1 DIABETES MELLITUS WITH HYPERGLYCEMIA (HCC): ICD-10-CM

## 2018-12-14 ENCOUNTER — TELEPHONE (OUTPATIENT)
Dept: FAMILY MEDICINE CLINIC | Facility: CLINIC | Age: 38
End: 2018-12-14

## 2018-12-14 NOTE — TELEPHONE ENCOUNTER
----- Message from Manda Alvarez sent at 12/14/2018  9:46 AM EST -----  Contact: [facility] CALLED IN STATING THAT THEY NEED CLARIFICATIONS ON THE FOLLOWING MEDICATION     NOVOLOG 100 UNIT/ML injection USE AS DIRECTED     [facility]

## 2018-12-14 NOTE — TELEPHONE ENCOUNTER
Patients pharm informed and needs to know the Max dose of units daily.    Ask for Yamilet/Gabriel/or Ally at [facility]

## 2018-12-19 ENCOUNTER — TELEPHONE (OUTPATIENT)
Dept: FAMILY MEDICINE CLINIC | Facility: CLINIC | Age: 38
End: 2018-12-19

## 2018-12-19 NOTE — TELEPHONE ENCOUNTER
Please call pharmacy and let them know he uses this in a pump and may be up to 2 units per hour, so 48 units per day. This would be the max

## 2018-12-19 NOTE — TELEPHONE ENCOUNTER
Dr. Whitfield is not here today. Pharmacy called pt is there to  insulin and they can't feel it until they get correct dosage instructions I'm unsure how many units not should be on. Please advise.      The insulin is novolog 100 mg/ml .

## 2018-12-19 NOTE — TELEPHONE ENCOUNTER
I'm not sure what to say the max dose is then. Previous rx for Novolog have stated use as directed with pump, no not sure why the pharmacy is having an issue filling now. Please contact pharmacy or have Dr. Whitfield address tomorrow.

## 2018-12-19 NOTE — TELEPHONE ENCOUNTER
See phone message from 12/14/18. This medication is used with his insulin pump and pharmacy needed Max dose of units daily. Need to contact patient or wife to determine what his max dose of insulin per day is.

## 2018-12-19 NOTE — TELEPHONE ENCOUNTER
Spoke w/ pt's wife and she does not now his max amount because its in his pump. (her words) however, he usually gets 6 vials at a time.

## 2018-12-19 NOTE — TELEPHONE ENCOUNTER
Spoke w/ pt's wife and she is not sure of the max amount per day because it's in his pump. She say's, he usually gets 6 vials at a time if that helps.

## 2019-01-04 ENCOUNTER — OFFICE VISIT (OUTPATIENT)
Dept: FAMILY MEDICINE CLINIC | Facility: CLINIC | Age: 39
End: 2019-01-04

## 2019-01-04 VITALS
BODY MASS INDEX: 29.68 KG/M2 | RESPIRATION RATE: 14 BRPM | OXYGEN SATURATION: 98 % | HEIGHT: 71 IN | WEIGHT: 212 LBS | SYSTOLIC BLOOD PRESSURE: 142 MMHG | DIASTOLIC BLOOD PRESSURE: 90 MMHG | TEMPERATURE: 97.8 F | HEART RATE: 97 BPM

## 2019-01-04 DIAGNOSIS — E10.65 UNCONTROLLED TYPE 1 DIABETES MELLITUS WITH HYPERGLYCEMIA (HCC): Primary | ICD-10-CM

## 2019-01-04 DIAGNOSIS — E78.2 MIXED HYPERLIPIDEMIA: ICD-10-CM

## 2019-01-04 PROCEDURE — 99213 OFFICE O/P EST LOW 20 MIN: CPT | Performed by: FAMILY MEDICINE

## 2019-01-04 NOTE — PROGRESS NOTES
Assessment/Plan       Problems Addressed this Visit        Cardiovascular and Mediastinum    Hyperlipidemia    Relevant Orders    Comprehensive Metabolic Panel    Lipid Panel With / Chol / HDL Ratio       Endocrine    Uncontrolled type 1 diabetes mellitus (CMS/[facility]) - Primary    Relevant Orders    Comprehensive Metabolic Panel    Lipid Panel With / Chol / HDL Ratio    Hemoglobin A1c            Follow up: Return if symptoms worsen or fail to improve.     DISCUSSION  Diabetes mellitus type 1 on insulin pump.  Reviewed glucose readings online through his continuous glucose monitor.  Sugars seem to be higher in the 4 PM to 12 AM range.  Continue 1.65 basal rate at that time but change from 8 PM to 4 PM.  Continue to monitor and if not improving, we will need to increase the basal rate.  Check labs as noted.  1.55 12 AM TO 4 PM.  1.65 4PM TO 12 AM     MEDICATIONS PRESCRIBED  Requested Prescriptions      No prescriptions requested or ordered in this encounter            -------------------------------------------    Subjective     Chief Complaint   Patient presents with   • Diabetes     type 2         Diabetes   He presents for his follow-up diabetic visit. He has type 1 diabetes mellitus. His disease course has been stable. There are no hypoglycemic associated symptoms. Associated symptoms include fatigue. Pertinent negatives for diabetes include no weight loss. Pertinent negatives for hypoglycemia complications include no nocturnal hypoglycemia. There are no diabetic complications. Risk factors for coronary artery disease include dyslipidemia. Current diabetic treatment includes insulin pump. He is compliant with treatment all of the time. His weight is stable. He is following a generally unhealthy diet. There is no change (reviewed trend) in his home blood glucose trend. An ACE inhibitor/angiotensin II receptor blocker is being taken. Eye exam is current.       Father  at age 85   Had been sick        Social  "History     Tobacco Use   Smoking Status Former Smoker   • Last attempt to quit:    • Years since quittin.0   Smokeless Tobacco Current User   • Types: Snuff        Past Medical History,Medications, Allergies, and social history was reviewed.      Review of Systems   Constitutional: Positive for fatigue. Negative for unexpected weight loss.   HENT: Negative.    Respiratory: Negative.    Cardiovascular: Negative.    Gastrointestinal: Negative.    Psychiatric/Behavioral: Negative.  Positive for stress.       Objective     Vitals:    19 1609   BP: 142/90   Pulse: 97   Resp: 14   Temp: 97.8 °F (36.6 °C)   TempSrc: Temporal   SpO2: 98%   Weight: 96.2 kg (212 lb)   Height: 180.3 cm (70.98\")          Physical Exam   Constitutional: Vital signs are normal. He appears well-developed and well-nourished.   HENT:   Head: Normocephalic and atraumatic.   Right Ear: Hearing, tympanic membrane, external ear and ear canal normal.   Left Ear: Hearing, tympanic membrane, external ear and ear canal normal.   Mouth/Throat: Oropharynx is clear and moist.   Eyes: Conjunctivae, EOM and lids are normal. Pupils are equal, round, and reactive to light.   Neck: Normal range of motion. Neck supple. No thyromegaly present.   Cardiovascular: Normal rate, regular rhythm and normal heart sounds. Exam reveals no friction rub.   No murmur heard.  Pulmonary/Chest: Effort normal and breath sounds normal. No respiratory distress. He has no wheezes. He has no rales.   Abdominal: Normal appearance.   Musculoskeletal: He exhibits no edema.   Neurological: He is alert. He has normal strength.   Skin: Skin is warm and dry.   Psychiatric: He has a normal mood and affect. His speech is normal. Cognition and memory are normal.   Nursing note and vitals reviewed.                Carlitos Whitfield MD    "

## 2019-01-05 LAB
ALBUMIN SERPL-MCNC: 4.76 G/DL (ref 3.2–4.8)
ALBUMIN/GLOB SERPL: 2 G/DL (ref 1.5–2.5)
ALP SERPL-CCNC: 110 U/L (ref 25–100)
ALT SERPL-CCNC: 41 U/L (ref 7–40)
AST SERPL-CCNC: 25 U/L (ref 0–33)
BILIRUB SERPL-MCNC: 0.4 MG/DL (ref 0.3–1.2)
BUN SERPL-MCNC: 20 MG/DL (ref 9–23)
BUN/CREAT SERPL: 23.8 (ref 7–25)
CALCIUM SERPL-MCNC: 9.6 MG/DL (ref 8.7–10.4)
CHLORIDE SERPL-SCNC: 102 MMOL/L (ref 99–109)
CHOLEST SERPL-MCNC: 224 MG/DL (ref 0–200)
CHOLEST/HDLC SERPL: 4.98 {RATIO}
CO2 SERPL-SCNC: 30 MMOL/L (ref 20–31)
CREAT SERPL-MCNC: 0.84 MG/DL (ref 0.6–1.3)
GLOBULIN SER CALC-MCNC: 2.4 GM/DL
GLUCOSE SERPL-MCNC: 71 MG/DL (ref 70–100)
HBA1C MFR BLD: 8.8 % (ref 4.8–5.6)
HDLC SERPL-MCNC: 45 MG/DL (ref 40–60)
LDLC SERPL CALC-MCNC: 154 MG/DL (ref 0–100)
POTASSIUM SERPL-SCNC: 3.7 MMOL/L (ref 3.5–5.5)
PROT SERPL-MCNC: 7.2 G/DL (ref 5.7–8.2)
SODIUM SERPL-SCNC: 138 MMOL/L (ref 132–146)
TRIGL SERPL-MCNC: 127 MG/DL (ref 0–150)
VLDLC SERPL CALC-MCNC: 25.4 MG/DL

## 2019-01-08 ENCOUNTER — TELEPHONE (OUTPATIENT)
Dept: FAMILY MEDICINE CLINIC | Facility: CLINIC | Age: 39
End: 2019-01-08

## 2019-01-08 NOTE — TELEPHONE ENCOUNTER
----- Message from Manda Alvarez sent at 1/8/2019  4:32 PM EST -----  Contact: NATHAN; MEDICATION CHANGE   ON Sunday PT HAD TO BE GIVEN 700 UNITS NOVOLOG.  DEXCOM. THEY WAS NOT ABLE TO GET HIS SUGAR UNDER 500. THEY ARE UNSURE OF HOW HIGH IT WAS BECAUSE HIS PUMP ONLY READY . HIS WIFE STATES THAT BECAUSE THE PT HAS BEEN ON NOVOLOG FOR SO LONG SHE THINKS THAT HE IS BECOMING INSULIN RESISTANT. THEY KNOW THIS THE PT BODY WILL NOT ACCEPT HUMALOG. THEY ARE NOT SURE WHAT OTHER TYPE OF INSULIN THERE ARE OR IF DR. EVANS WOULD EVEN LIKE TO ADD ANOTHER ONE.     CALL BACK   [phone]

## 2019-01-08 NOTE — TELEPHONE ENCOUNTER
Please call, Please clarify how much insulin. 700 units does not seem correct. I don't think he is that resistant because his A1c was elevated but not so high that he is resistant. Plus he has had some normal sugars and on the lab we did his sugar was only 71.   I would make sure the tubing is right on his pump and going into the skin properly. Maybe replace it all and start over.

## 2019-01-21 DIAGNOSIS — E10.65 UNCONTROLLED TYPE 1 DIABETES MELLITUS WITH HYPERGLYCEMIA (HCC): Primary | ICD-10-CM

## 2019-04-05 DIAGNOSIS — E10.65 UNCONTROLLED TYPE 1 DIABETES MELLITUS WITH HYPERGLYCEMIA (HCC): ICD-10-CM

## 2019-04-05 NOTE — TELEPHONE ENCOUNTER
----- Message from Dario Yost Rep sent at 4/5/2019 11:27 AM EDT -----  Contact: PT WIFE SHIV; LAITH REYES    NOVOLOG 100 UNIT/ML injection  6 VIALS    PATIENT WILL BE OUT AFTER TODAY NEEDS CALLED IN TODAY    GORDO TATUM    PT: [phone]

## 2019-05-03 ENCOUNTER — TELEPHONE (OUTPATIENT)
Dept: FAMILY MEDICINE CLINIC | Facility: CLINIC | Age: 39
End: 2019-05-03

## 2019-05-03 DIAGNOSIS — E10.65 UNCONTROLLED TYPE 1 DIABETES MELLITUS WITH HYPERGLYCEMIA (HCC): ICD-10-CM

## 2019-05-03 NOTE — TELEPHONE ENCOUNTER
----- Message from Dario Yost sent at 5/3/2019 12:28 PM EDT -----  Contact: PT WIFE KEN  REFILL    PATIENTS WIFE IS REQUESTING A NURSE CALL HER SHE NEEDS TO SPEAK WITH A NURSE ABOUT [facility] AND THE PATIENTS PRESCRIPTION.    NOVOLOG 100 UNIT/ML injection     GORDO TATUM    PT: [phone]

## 2019-05-06 NOTE — TELEPHONE ENCOUNTER
Patient wife informed per front office, [facility] has what they need, they will be getting Rx ready for pickup.

## 2019-07-15 ENCOUNTER — OFFICE VISIT (OUTPATIENT)
Dept: FAMILY MEDICINE CLINIC | Facility: CLINIC | Age: 39
End: 2019-07-15

## 2019-07-15 VITALS
DIASTOLIC BLOOD PRESSURE: 82 MMHG | BODY MASS INDEX: 28.56 KG/M2 | HEIGHT: 71 IN | TEMPERATURE: 98.5 F | SYSTOLIC BLOOD PRESSURE: 128 MMHG | WEIGHT: 204 LBS | HEART RATE: 76 BPM | RESPIRATION RATE: 18 BRPM

## 2019-07-15 DIAGNOSIS — E78.2 MIXED HYPERLIPIDEMIA: ICD-10-CM

## 2019-07-15 DIAGNOSIS — T24.101A SUPERFICIAL BURN OF RIGHT LOWER EXTREMITY, INITIAL ENCOUNTER: ICD-10-CM

## 2019-07-15 DIAGNOSIS — E10.65 UNCONTROLLED TYPE 1 DIABETES MELLITUS WITH HYPERGLYCEMIA (HCC): Primary | ICD-10-CM

## 2019-07-15 LAB
A/C: <30
POC CREATININE URINE: 200
POC MICROALBUMIN URINE: 30

## 2019-07-15 PROCEDURE — 82044 UR ALBUMIN SEMIQUANTITATIVE: CPT | Performed by: FAMILY MEDICINE

## 2019-07-15 PROCEDURE — 99214 OFFICE O/P EST MOD 30 MIN: CPT | Performed by: FAMILY MEDICINE

## 2019-07-15 RX ORDER — HEPATITIS A VACCINE 1440 [IU]/ML
INJECTION, SUSPENSION INTRAMUSCULAR
Refills: 0 | COMMUNITY
Start: 2019-06-12 | End: 2019-07-15

## 2019-07-15 NOTE — PROGRESS NOTES
Assessment/Plan       Problems Addressed this Visit        Cardiovascular and Mediastinum    Hyperlipidemia    Relevant Orders    Comprehensive Metabolic Panel    Lipid Panel With / Chol / HDL Ratio       Endocrine    Uncontrolled type 1 diabetes mellitus (CMS/[facility]) - Primary    Relevant Orders    Comprehensive Metabolic Panel    Hemoglobin A1c    Lipid Panel With / Chol / HDL Ratio    POC Microalbumin (Completed)      Other Visit Diagnoses     Superficial burn of right lower extremity, initial encounter                Follow up: Return in about 3 months (around 10/15/2019).     DISCUSSION  Recommend increasing basal rate from 12 AM to 4 PM to 1.6 and 4 PM to 12 a.m. to 1.7.    Check labs as noted.    In 3 weeks, will review readings again through Dexcom and see how he has been doing.    Keep burn clean.  First-degree burn.  Call if worsens or any problems.  Recommend wear pants while welding.      MEDICATIONS PRESCRIBED  Requested Prescriptions      No prescriptions requested or ordered in this encounter            -------------------------------------------    Subjective     Chief Complaint   Patient presents with   • Diabetes     Checkup   • Burn     burn on right leg from welding. No blisters.          Diabetes   He presents for his follow-up diabetic visit. He has type 1 diabetes mellitus. His disease course has been stable. There are no hypoglycemic associated symptoms. Associated symptoms include fatigue. Pertinent negatives for diabetes include no weight loss. Pertinent negatives for hypoglycemia complications include no nocturnal hypoglycemia. There are no diabetic complications. Risk factors for coronary artery disease include dyslipidemia. Current diabetic treatment includes insulin pump. He is compliant with treatment all of the time. His weight is stable. He is following a generally unhealthy diet. There is no change (reviewed trend) in his home blood glucose trend. An ACE inhibitor/angiotensin II  "receptor blocker is being taken. Eye exam is current.   Burn   The incident occurred 12 to 24 hours ago. The burns occurred at home (Was welding at home, making a palate tool. + burned the right leg). Burn context: welding. The burns are located on the right lower leg (no blisters and just red). The pain is mild. Treatments tried: aloe lotion  The treatment provided mild relief.               Social History     Tobacco Use   Smoking Status Former Smoker   • Last attempt to quit:    • Years since quittin.5   Smokeless Tobacco Current User   • Types: Snuff        Past Medical History,Medications, Allergies, and social history was reviewed.      Review of Systems   Constitutional: Positive for fatigue. Negative for unexpected weight loss.   HENT: Negative.    Cardiovascular: Negative.    Gastrointestinal: Negative.    Neurological: Negative.    Psychiatric/Behavioral: Negative.        Objective     Vitals:    07/15/19 1130   BP: 128/82   Pulse: 76   Resp: 18   Temp: 98.5 °F (36.9 °C)   Weight: 92.5 kg (204 lb)   Height: 180.3 cm (71\")          Physical Exam   Constitutional: Vital signs are normal. He appears well-developed and well-nourished.   HENT:   Head: Normocephalic and atraumatic.   Right Ear: Hearing, tympanic membrane, external ear and ear canal normal.   Left Ear: Hearing, tympanic membrane, external ear and ear canal normal.   Mouth/Throat: Oropharynx is clear and moist.   Eyes: Conjunctivae, EOM and lids are normal. Pupils are equal, round, and reactive to light.   Neck: Normal range of motion. Neck supple. No thyromegaly present.   Cardiovascular: Normal rate, regular rhythm and normal heart sounds. Exam reveals no friction rub.   No murmur heard.  Pulmonary/Chest: Effort normal and breath sounds normal. No respiratory distress. He has no wheezes. He has no rales.   Abdominal: Soft. Normal appearance and bowel sounds are normal. He exhibits no distension and no mass. There is no tenderness. There " is no rebound and no guarding.   Musculoskeletal: He exhibits no edema.   Right lower extremity medially on the ankle to the knee is a diffusely red area without vesicles.  Consistent with first-degree burn.  Tender.   Neurological: He is alert. He has normal strength.   Skin: Skin is warm and dry.   Psychiatric: He has a normal mood and affect. His speech is normal. Cognition and memory are normal.   Nursing note and vitals reviewed.                Carlitos Whitfield MD

## 2019-07-16 LAB
ALBUMIN SERPL-MCNC: 4.8 G/DL (ref 3.5–5.2)
ALBUMIN/GLOB SERPL: 2.3 G/DL
ALP SERPL-CCNC: 94 U/L (ref 39–117)
ALT SERPL-CCNC: 17 U/L (ref 1–41)
AST SERPL-CCNC: 15 U/L (ref 1–40)
BILIRUB SERPL-MCNC: 0.5 MG/DL (ref 0.2–1.2)
BUN SERPL-MCNC: 14 MG/DL (ref 6–20)
BUN/CREAT SERPL: 17.1 (ref 7–25)
CALCIUM SERPL-MCNC: 9.2 MG/DL (ref 8.6–10.5)
CHLORIDE SERPL-SCNC: 99 MMOL/L (ref 98–107)
CHOLEST SERPL-MCNC: 187 MG/DL (ref 0–200)
CHOLEST/HDLC SERPL: 4.56 {RATIO}
CO2 SERPL-SCNC: 28.6 MMOL/L (ref 22–29)
CREAT SERPL-MCNC: 0.82 MG/DL (ref 0.76–1.27)
GLOBULIN SER CALC-MCNC: 2.1 GM/DL
GLUCOSE SERPL-MCNC: 155 MG/DL (ref 65–99)
HBA1C MFR BLD: 9 % (ref 4.8–5.6)
HDLC SERPL-MCNC: 41 MG/DL (ref 40–60)
LDLC SERPL CALC-MCNC: 129 MG/DL (ref 0–100)
POTASSIUM SERPL-SCNC: 4.6 MMOL/L (ref 3.5–5.2)
PROT SERPL-MCNC: 6.9 G/DL (ref 6–8.5)
SODIUM SERPL-SCNC: 140 MMOL/L (ref 136–145)
TRIGL SERPL-MCNC: 87 MG/DL (ref 0–150)
VLDLC SERPL CALC-MCNC: 17.4 MG/DL

## 2019-08-06 ENCOUNTER — TELEPHONE (OUTPATIENT)
Dept: FAMILY MEDICINE CLINIC | Facility: CLINIC | Age: 39
End: 2019-08-06

## 2019-08-06 NOTE — TELEPHONE ENCOUNTER
Please call, I reviewed his sugar readings and seem to be getting better late afternoon but still increased 12 am on so rec increase basal rate of insulin to 1.7 all 24 hrs.

## 2019-08-23 DIAGNOSIS — E10.65 UNCONTROLLED TYPE 1 DIABETES MELLITUS WITH HYPERGLYCEMIA (HCC): ICD-10-CM

## 2019-09-06 ENCOUNTER — TELEPHONE (OUTPATIENT)
Dept: FAMILY MEDICINE CLINIC | Facility: CLINIC | Age: 39
End: 2019-09-06

## 2019-09-06 NOTE — TELEPHONE ENCOUNTER
----- Message from Manda Alvarez sent at 9/6/2019 11:33 AM EDT -----  Contact: rosi;   Pt rx for cpap supplies needs to be refill and [facility].       Call back   [phone]

## 2019-12-08 ENCOUNTER — TELEPHONE (OUTPATIENT)
Dept: FAMILY MEDICINE CLINIC | Facility: CLINIC | Age: 39
End: 2019-12-08

## 2019-12-08 DIAGNOSIS — E10.65 UNCONTROLLED TYPE 1 DIABETES MELLITUS WITH HYPERGLYCEMIA (HCC): ICD-10-CM

## 2019-12-26 ENCOUNTER — OFFICE VISIT (OUTPATIENT)
Dept: FAMILY MEDICINE CLINIC | Facility: CLINIC | Age: 39
End: 2019-12-26

## 2019-12-26 VITALS
SYSTOLIC BLOOD PRESSURE: 122 MMHG | HEART RATE: 78 BPM | RESPIRATION RATE: 18 BRPM | HEIGHT: 71 IN | BODY MASS INDEX: 28.84 KG/M2 | TEMPERATURE: 97.2 F | WEIGHT: 206 LBS | DIASTOLIC BLOOD PRESSURE: 82 MMHG

## 2019-12-26 DIAGNOSIS — E10.40 DM NEUROPATHY, TYPE I DIABETES MELLITUS (HCC): ICD-10-CM

## 2019-12-26 DIAGNOSIS — E78.2 MIXED HYPERLIPIDEMIA: ICD-10-CM

## 2019-12-26 DIAGNOSIS — E10.65 UNCONTROLLED TYPE 1 DIABETES MELLITUS WITH HYPERGLYCEMIA (HCC): Primary | ICD-10-CM

## 2019-12-26 LAB
ALBUMIN SERPL-MCNC: 4.2 G/DL (ref 3.5–5.2)
ALBUMIN/GLOB SERPL: 1.6 G/DL
ALP SERPL-CCNC: 95 U/L (ref 39–117)
ALT SERPL-CCNC: 31 U/L (ref 1–41)
AST SERPL-CCNC: 19 U/L (ref 1–40)
BASOPHILS # BLD AUTO: 0.03 10*3/MM3 (ref 0–0.2)
BASOPHILS NFR BLD AUTO: 0.6 % (ref 0–1.5)
BILIRUB SERPL-MCNC: 0.4 MG/DL (ref 0.2–1.2)
BUN SERPL-MCNC: 18 MG/DL (ref 6–20)
BUN/CREAT SERPL: 19.6 (ref 7–25)
CALCIUM SERPL-MCNC: 9.1 MG/DL (ref 8.6–10.5)
CHLORIDE SERPL-SCNC: 100 MMOL/L (ref 98–107)
CHOLEST SERPL-MCNC: 171 MG/DL (ref 0–200)
CHOLEST/HDLC SERPL: 6.58 {RATIO}
CO2 SERPL-SCNC: 26 MMOL/L (ref 22–29)
CREAT SERPL-MCNC: 0.92 MG/DL (ref 0.76–1.27)
EOSINOPHIL # BLD AUTO: 0.17 10*3/MM3 (ref 0–0.4)
EOSINOPHIL NFR BLD AUTO: 3.3 % (ref 0.3–6.2)
ERYTHROCYTE [DISTWIDTH] IN BLOOD BY AUTOMATED COUNT: 12.3 % (ref 12.3–15.4)
GLOBULIN SER CALC-MCNC: 2.6 GM/DL
GLUCOSE SERPL-MCNC: 206 MG/DL (ref 65–99)
HBA1C MFR BLD: 8.5 % (ref 4.8–5.6)
HCT VFR BLD AUTO: 45 % (ref 37.5–51)
HDLC SERPL-MCNC: 26 MG/DL (ref 40–60)
HGB BLD-MCNC: 15.5 G/DL (ref 13–17.7)
IMM GRANULOCYTES # BLD AUTO: 0.01 10*3/MM3 (ref 0–0.05)
IMM GRANULOCYTES NFR BLD AUTO: 0.2 % (ref 0–0.5)
LDLC SERPL CALC-MCNC: 123 MG/DL (ref 0–100)
LYMPHOCYTES # BLD AUTO: 1.75 10*3/MM3 (ref 0.7–3.1)
LYMPHOCYTES NFR BLD AUTO: 33.6 % (ref 19.6–45.3)
MCH RBC QN AUTO: 31.2 PG (ref 26.6–33)
MCHC RBC AUTO-ENTMCNC: 34.4 G/DL (ref 31.5–35.7)
MCV RBC AUTO: 90.5 FL (ref 79–97)
MONOCYTES # BLD AUTO: 0.51 10*3/MM3 (ref 0.1–0.9)
MONOCYTES NFR BLD AUTO: 9.8 % (ref 5–12)
NEUTROPHILS # BLD AUTO: 2.74 10*3/MM3 (ref 1.7–7)
NEUTROPHILS NFR BLD AUTO: 52.5 % (ref 42.7–76)
NRBC BLD AUTO-RTO: 0 /100 WBC (ref 0–0.2)
PLATELET # BLD AUTO: 371 10*3/MM3 (ref 140–450)
POTASSIUM SERPL-SCNC: 4.7 MMOL/L (ref 3.5–5.2)
PROT SERPL-MCNC: 6.8 G/DL (ref 6–8.5)
RBC # BLD AUTO: 4.97 10*6/MM3 (ref 4.14–5.8)
SODIUM SERPL-SCNC: 139 MMOL/L (ref 136–145)
TRIGL SERPL-MCNC: 112 MG/DL (ref 0–150)
VLDLC SERPL CALC-MCNC: 22.4 MG/DL
WBC # BLD AUTO: 5.21 10*3/MM3 (ref 3.4–10.8)

## 2019-12-26 PROCEDURE — 99214 OFFICE O/P EST MOD 30 MIN: CPT | Performed by: FAMILY MEDICINE

## 2019-12-26 RX ORDER — BENZONATATE 200 MG/1
CAPSULE ORAL
COMMUNITY
Start: 2019-12-22 | End: 2020-03-30

## 2019-12-26 RX ORDER — OSELTAMIVIR PHOSPHATE 75 MG/1
CAPSULE ORAL
COMMUNITY
Start: 2019-12-22 | End: 2020-03-30

## 2019-12-26 NOTE — PROGRESS NOTES
Assessment/Plan       Problems Addressed this Visit        Cardiovascular and Mediastinum    Hyperlipidemia    Relevant Orders    Comprehensive Metabolic Panel    Lipid Panel With / Chol / HDL Ratio       Endocrine    Uncontrolled type 1 diabetes mellitus (CMS/HCC) - Primary    Relevant Orders    CBC & Differential    Comprehensive Metabolic Panel    Lipid Panel With / Chol / HDL Ratio    Hemoglobin A1c    DM neuropathy, type I diabetes mellitus (CMS/[facility])            Follow up: Return for follow up depends on review of labs and testing.     DISCUSSION  Uncontrolled diabetes mellitus type 1.  Recommend increase/change basal rate from 10 PM to 6 AM of 1.7 units/h and 6 AM to 10 PM of 1.6 units/h.  Continue carb counting.  Sliding scale insulin.  Check labs as noted.    Patient had decreased sensation of the great toes bilaterally.  He was having some issues with his feet at work and would like to try some diabetic shoes.  Prescription will be printed and will fax to [facility].    Hyperlipidemia.  Check CMP and lipid panel.         MEDICATIONS PRESCRIBED  Requested Prescriptions      No prescriptions requested or ordered in this encounter          -------------------------------------------    Subjective     Chief Complaint   Patient presents with   • Diabetes     f/u          Diabetes   He presents for his follow-up diabetic visit. He has type 1 diabetes mellitus. His disease course has been stable. There are no hypoglycemic associated symptoms. Associated symptoms include fatigue. Pertinent negatives for diabetes include no weight loss. There are no hypoglycemic complications. Pertinent negatives for hypoglycemia complications include no nocturnal hypoglycemia. (Occ low during the day  ) There are no diabetic complications. Risk factors for coronary artery disease include dyslipidemia. Current diabetic treatment includes insulin pump. He is compliant with treatment all of the time. His weight is stable. He is  "following a generally unhealthy diet. There is no change (reviewed trend) in his home blood glucose trend. An ACE inhibitor/angiotensin II receptor blocker is being taken. Eye exam is current.   Hyperlipidemia   This is a chronic problem. The problem is uncontrolled. Recent lipid tests were reviewed and are high. He is currently on no antihyperlipidemic treatment. There are no compliance problems.  Risk factors for coronary artery disease include diabetes mellitus and dyslipidemia.     reviewed DEXcom readings      Had the flu last Saturday, he is feeling better    Wife got it as well, had to get fluids  Went to [facility] ER  On Tamiflu      Last 4 days of work, + cramp in feet, was left foot only , right was ok      ? Need to Diabetic shoes  No numbness in feet  No pain now   Has not worked      Social History     Tobacco Use   Smoking Status Former Smoker   • Last attempt to quit:    • Years since quittin.9   Smokeless Tobacco Current User   • Types: Snuff          Past Medical History,Medications, Allergies, and social history was reviewed.          Review of Systems   Constitutional: Positive for fatigue. Negative for unexpected weight loss.   HENT: Negative.    Respiratory: Positive for cough ( Recent diagnosis of flu but getting better).    Cardiovascular: Negative.    Gastrointestinal: Negative.    Musculoskeletal:        Foot cramps   Neurological: Negative.    Psychiatric/Behavioral: Negative.        Objective     Vitals:    19 0928   BP: 122/82   Pulse: 78   Resp: 18   Temp: 97.2 °F (36.2 °C)   Weight: 93.4 kg (206 lb)   Height: 180.3 cm (71\")          Physical Exam   Constitutional: Vital signs are normal. He appears well-developed and well-nourished.   HENT:   Head: Normocephalic and atraumatic.   Right Ear: Hearing, tympanic membrane, external ear and ear canal normal.   Left Ear: Hearing, tympanic membrane, external ear and ear canal normal.   Mouth/Throat: Oropharynx is clear and moist. "   Eyes: Pupils are equal, round, and reactive to light. Conjunctivae, EOM and lids are normal.   Neck: Normal range of motion. Neck supple. No thyromegaly present.   Cardiovascular: Normal rate, regular rhythm and normal heart sounds. Exam reveals no friction rub.   No murmur heard.  Pulmonary/Chest: Effort normal and breath sounds normal. No respiratory distress. He has no wheezes. He has no rales.   Abdominal: Soft. Normal appearance and bowel sounds are normal. He exhibits no distension and no mass. There is no tenderness. There is no rebound and no guarding.   Musculoskeletal: He exhibits no edema.    Robert had a diabetic foot exam performed today.   During the foot exam he had a monofilament test performed (decreased sensation of the great toes bilateral).  Vascular Status -  His right foot exhibits normal foot vasculature  and no edema. His left foot exhibits normal foot vasculature  and no edema.  Skin Integrity  -  He has callous right foot ( medial great toe).He has callous left foot ( medial great toe)..  Neurological: He is alert. He has normal strength.   Skin: Skin is warm and dry.   Psychiatric: He has a normal mood and affect. His speech is normal. Cognition and memory are normal.   Nursing note and vitals reviewed.                Carlitos Whitfield MD

## 2020-01-28 DIAGNOSIS — E10.65 UNCONTROLLED TYPE 1 DIABETES MELLITUS WITH HYPERGLYCEMIA (HCC): ICD-10-CM

## 2020-03-05 ENCOUNTER — TELEPHONE (OUTPATIENT)
Dept: FAMILY MEDICINE CLINIC | Facility: CLINIC | Age: 40
End: 2020-03-05

## 2020-03-05 DIAGNOSIS — E10.65 UNCONTROLLED TYPE 1 DIABETES MELLITUS WITH HYPERGLYCEMIA (HCC): Primary | ICD-10-CM

## 2020-03-05 RX ORDER — FLASH GLUCOSE SCANNING READER
1 EACH MISCELLANEOUS DAILY
Qty: 1 DEVICE | Refills: 5 | Status: SHIPPED | OUTPATIENT
Start: 2020-03-05 | End: 2022-05-31

## 2020-03-05 RX ORDER — FLASH GLUCOSE SENSOR
1 KIT MISCELLANEOUS
Qty: 2 EACH | Refills: 5 | Status: SHIPPED | OUTPATIENT
Start: 2020-03-05 | End: 2022-05-31

## 2020-03-05 NOTE — TELEPHONE ENCOUNTER
Patient's wife stated the patient's Tandem pump is not working. Patient would like to request a Med Chronic pump. Patient was informed by his insurance that his doctor would need to request this for him.    Please call and advise. Patient call back [phone]

## 2020-03-05 NOTE — TELEPHONE ENCOUNTER
Please call, I think she means [employer] pump. Please confirm. And do they have specific model they want. Usually [employer] has Minimed pump and 3 different models. Have they made arrangements yet to get one? ie from where?        Also, please confirm doing 1.7 units per hour 10 pm to 6 am and 1.6 units per hour 6 am to 10 pm.

## 2020-03-05 NOTE — TELEPHONE ENCOUNTER
CANNOT AFFORD TO STAY ON THE DEXCOM, WANT TO CHANGE TO FREE STYLE LIVRA. 14 DAY READER, 28 DAY SUPPLE FREE DAY  14 DAY SENCORS WITH REFILLS.     [facility]

## 2020-03-06 NOTE — TELEPHONE ENCOUNTER
Called and spoke to patient and patient's wife as ordered by provider. Pt informed that the Medtronic Pump is the one they want specifically the Mini medtronic 670G. States he is currently using the Tandem insulin pump and continue to have multiple problems with it. States it doesn't stay where its supposed to and insulin doesn't get into his body and often runs down the outside of his body instead of being delivered effectively.  Also the machine doesn't want to give insulin after he refills reservoir and once insulin is given still says the numbers don't change and it still says the same amount is in reservoir as prior to giving insulin. Pt's wife stated she spoke with their insurance and normally pts are locked into a pump for 4years but that can be changed if dr's office provides reasons why pump is ineffective for patient.  Pt confirmed dose amount from 10p-6am 1.7units per hr and from 6a-10p receives dose of 1.6 units per hour.      Pt and pt's wife would like provider to also order a freestyle Kiara sensor and refill infusion set. States it is similar to dexcom (which patient does not have) and allows provider to see glucose readings. Please advise, Thanks.

## 2020-03-06 NOTE — TELEPHONE ENCOUNTER
Please call, I sent the Kiara in yesterday. But not sure where the pump Rx needs to go to. Do they know?

## 2020-03-09 RX ORDER — SUBCUTANEOUS INSULIN PUMP
1 EACH MISCELLANEOUS DAILY
Qty: 1 DEVICE | Refills: 0 | Status: SHIPPED | OUTPATIENT
Start: 2020-03-09 | End: 2020-03-30

## 2020-03-09 NOTE — TELEPHONE ENCOUNTER
Please call insurance and do PA for Minimed insulin pump. Current pump is not functioning and needs ASAP. Dx Type 1 DM insulin dependent. Uncontrolled

## 2020-03-09 NOTE — TELEPHONE ENCOUNTER
Please call patient/ Chilo. We will need to send the pump Rx to someplace 1st before generating the PA. Do they want to use a particular place? ie [facility] again ?

## 2020-03-09 NOTE — TELEPHONE ENCOUNTER
Please call [employer] at [phone] to see what we need to do to get him started on this.  Can call Tuesday, March 10, 2020

## 2020-03-09 NOTE — TELEPHONE ENCOUNTER
Called insurance company. They need procedure code/CPT codes for items to see if they need a PA. She asked if it was a DME item. We have not prior authorized these before. It may need to be ordered through a company and they would be able to help get approval?

## 2020-03-09 NOTE — TELEPHONE ENCOUNTER
PTS WIFE RETURNING CALL TO JANELLE.  SHE STATES WE NEED TO DO A PRIOR AUTH TO THE INSURANCE COMPANY EXPLAINING WHY HE NEEDS TO SWITCH PUMPS.

## 2020-03-10 ENCOUNTER — TELEPHONE (OUTPATIENT)
Dept: FAMILY MEDICINE CLINIC | Facility: CLINIC | Age: 40
End: 2020-03-10

## 2020-03-10 DIAGNOSIS — E10.65 UNCONTROLLED TYPE 1 DIABETES MELLITUS WITH HYPERGLYCEMIA (HCC): Primary | ICD-10-CM

## 2020-03-10 NOTE — TELEPHONE ENCOUNTER
Called varun she stated that they have already called the manufacture. They are on the 3 pump and this one is under warranty til 1/ 2021. Stated it was going to cost $899. Due to the pump missing up so much they want to change brands. Stated they would go to endocrinology with [facility] since they can see everything in chart but would need to go asap.     For referral best number to call is [phone] wife's phone.

## 2020-03-10 NOTE — TELEPHONE ENCOUNTER
Discussed with wife   Issues with pump current:    Not pumping out   Battery dies quickly, instantly  Middle of night: give more than set up for and has low sugar  BS increased to > 500 when not pumping

## 2020-03-10 NOTE — TELEPHONE ENCOUNTER
Called [employer] number listed they stated they would get into contact with patient but since under warrenty with of pump maker is still affective they would have to go through them.   Called [phone] they stated wrong department. Then transferred me to [phone]. After 45 mins on hold waiting for them the people stated they couldn't even find the pump to give any information to start a PA or case for new one.   See other message.

## 2020-03-10 NOTE — TELEPHONE ENCOUNTER
Please call, after Tiffany has spent nearly an hour on the phone with insurance, they are not being helpful and [employer] stated that since old one is on warranty, they probably would not even be able to get a new pump.  At least through [employer].    Options: 1.  They call the current pump maker again and try to get a new pump since this is not functioning and it is under warranty    Or 2.  Can refer to endocrinology who would be able to navigate getting pump approval more efficiently than what we can.

## 2020-03-30 ENCOUNTER — OFFICE VISIT (OUTPATIENT)
Dept: ENDOCRINOLOGY | Facility: CLINIC | Age: 40
End: 2020-03-30

## 2020-03-30 VITALS
TEMPERATURE: 98.2 F | DIASTOLIC BLOOD PRESSURE: 84 MMHG | SYSTOLIC BLOOD PRESSURE: 122 MMHG | HEART RATE: 111 BPM | OXYGEN SATURATION: 99 % | WEIGHT: 208.4 LBS | BODY MASS INDEX: 29.07 KG/M2

## 2020-03-30 DIAGNOSIS — Z46.81 INSULIN PUMP TITRATION: ICD-10-CM

## 2020-03-30 DIAGNOSIS — E10.65 UNCONTROLLED TYPE 1 DIABETES MELLITUS WITH HYPERGLYCEMIA (HCC): Primary | ICD-10-CM

## 2020-03-30 LAB
BILIRUB BLD-MCNC: NEGATIVE MG/DL
CLARITY, POC: CLEAR
COLOR UR: YELLOW
GLUCOSE BLDC GLUCOMTR-MCNC: 409 MG/DL (ref 70–130)
GLUCOSE UR STRIP-MCNC: ABNORMAL MG/DL
HBA1C MFR BLD: 7.9 %
KETONES UR QL: NEGATIVE
LEUKOCYTE EST, POC: NEGATIVE
NITRITE UR-MCNC: NEGATIVE MG/ML
PH UR: 5 [PH] (ref 5–8)
PROT UR STRIP-MCNC: NEGATIVE MG/DL
RBC # UR STRIP: NEGATIVE /UL
SP GR UR: 1.01 (ref 1–1.03)
UROBILINOGEN UR QL: NORMAL

## 2020-03-30 PROCEDURE — 83036 HEMOGLOBIN GLYCOSYLATED A1C: CPT | Performed by: PHYSICIAN ASSISTANT

## 2020-03-30 PROCEDURE — 81003 URINALYSIS AUTO W/O SCOPE: CPT | Performed by: PHYSICIAN ASSISTANT

## 2020-03-30 PROCEDURE — 82947 ASSAY GLUCOSE BLOOD QUANT: CPT | Performed by: PHYSICIAN ASSISTANT

## 2020-03-30 PROCEDURE — 99244 OFF/OP CNSLTJ NEW/EST MOD 40: CPT | Performed by: PHYSICIAN ASSISTANT

## 2020-03-30 NOTE — PROGRESS NOTES
"Chief Complaint  Establish care for Diabetes Mellitus.    HPI   Robert Barron is a 40 y.o. male who is here today for evaluation of Diabetes Mellitus type 1. The initial diagnosis of diabetes was made at age 25.     Currently using Tandem pump. Infusion sets (autosoft 90) leak frequently. He fills up his with 300 units of insulin but the amount of his screen never changes from 300 units, even after he uses quite a bit of insulin. Pump dies suddenly even if it is >25% charged at the time.   First started with Tandem pump 4-5 years ago and sugars were controlled initially. After a couple of years the pump start malfunctioning, got another pump and worked well for a while, but then started malfunctioning too. He talked to [employer] few weeks ago and was told he would be charged to have current pump fixed.   He at times puts in 180 grams of carb at a time to try to get his sugar down but they remain elevated.    Wants Medtronic pump. Has another year before warranty out on current pump. He has talked to his insurance and was told Medtronic pump would be covered if \"authorized by doctor.\"    A1C- 7.9 (3/30/20), 8.5 (12/26/19)    Diabetic complications: peripheral neuropathy, DKA 2016  Eye exam current (within one year): utd  Foot care and dental care: discussed    Current diabetic medications include:  Novolog via Tandem Pump    Statin: no, did not tolerate pravastatin    Past medications: humalog (pt reports it was not effective)    Diabetic Monitoring  -   Unable to download Tandem pump/Freestyle Kiara due to technical difficulties. I did review bs readings on his Kiara phone edinson with majority of bs readings 300-400. He has one reading of 48 which occurred after he did many corrections in a row to correct a high sugar >500.     He has Dexcom but is not using it. Has trouble getting sensors to go into the skin.     Nutrition:     Current diet: in general, an \"unhealthy\" diet, on average,  meals per day  Current " exercise: none    The following portions of the patient's history were reviewed and updated by me as appropriate: allergies, current medications, past family history, past social history, past surgical history and problem list.    Past Medical History:   Diagnosis Date   • Diabetes with ketoacidosis (CMS/HCC)    • Hyperlipidemia        Medications    Current Outpatient Medications:   •  Continuous Blood Gluc  (FREESTYLE ROSAMARIA 14 DAY READER) device, 1 Device Daily., Disp: 1 Device, Rfl: 5  •  Continuous Blood Gluc Sensor (FREESTYLE ROSAMARIA 14 DAY SENSOR) mis, 1 Device Every 14 (Fourteen) Days., Disp: 2 each, Rfl: 5  •  glucose blood test strip, Test glucose QID, Disp: 100 each, Rfl: 11  •  insulin aspart (NovoLOG) 100 UNIT/ML injection, USE AS DIRECTED IN  INSULIN  PUMP  **MAX  OF  200  UNITS  PER  DAY**, Disp: 60 mL, Rfl: 6  •  Continuous Glucose Monitor (DEXCOM G5 MOBILE ) kit, , Disp: , Rfl:     Review of Systems  Review of Systems   Constitutional: Positive for appetite change (decreased), fatigue and unexpected weight change (weight gain).        Feeling poorly   All other systems reviewed and are negative.       Physical Exam    /84   Pulse 111   Temp 98.2 °F (36.8 °C)   Wt 94.5 kg (208 lb 6.4 oz)   SpO2 99%   BMI 29.07 kg/m² Body mass index is 29.07 kg/m².  Physical Exam   Constitutional: He is oriented to person, place, and time. He appears well-developed. No distress.   HENT:   Head: Normocephalic.   Right Ear: External ear normal.   Left Ear: External ear normal.   Nose: Nose normal.   Eyes: Conjunctivae are normal. Right eye exhibits no discharge. Left eye exhibits no discharge. No scleral icterus.   Neck: Neck supple. No JVD present. No tracheal deviation present. No thyromegaly present.   Cardiovascular: Normal rate, regular rhythm, normal heart sounds and intact distal pulses.   No murmur heard.  Pulmonary/Chest: Effort normal and breath sounds normal. No respiratory distress.  He has no wheezes.   Abdominal: Soft. Bowel sounds are normal. There is no tenderness.   Musculoskeletal: He exhibits no edema or tenderness.    Robert had a diabetic foot exam performed today.   During the foot exam he had a monofilament test performed.    Neurological Sensory Findings -  Altered sharp/dull right ankle/foot discrimination and altered sharp/dull left ankle/foot discrimination.  Vascular Status -  His right foot exhibits normal foot vasculature  and no edema. His left foot exhibits normal foot vasculature  and no edema.  Skin Integrity  -  His right foot skin is intact.  He has no right foot onychomycosis, no right foot ulcer and non-callous right foot.His left foot skin is intact. He has no left foot onychomycosis, no left foot ulcer and non-callous left foot..  Neurological: He is alert and oriented to person, place, and time.   Skin: Skin is warm and dry. No rash noted. He is not diaphoretic. No erythema.   Psychiatric: He has a normal mood and affect. His behavior is normal. Judgment and thought content normal.       Labs and Imaging   Lab Results   Component Value Date    HGBA1C 7.9 03/30/2020    HGBA1C 8.50 (H) 12/26/2019    HGBA1C 9.00 (H) 07/15/2019       Assessment / Plan   Robert was seen today for establish care.    Diagnoses and all orders for this visit:    Uncontrolled type 1 diabetes mellitus with hyperglycemia (CMS/[facility])  -     POC Glucose Fingerstick  -     POC Glycosylated Hemoglobin (Hb A1C)  -     POC Urinalysis Dipstick, Automated  -     insulin aspart (NovoLOG) 100 UNIT/ML injection; USE AS DIRECTED IN  INSULIN  PUMP  **MAX  OF  200  UNITS  PER  DAY**    Insulin pump titration        Diabetes Mellitus 1 is under poor control.  -with controlled peripheral neuropathy  -A1c 7.9  -bs 409 during visit, negative ketone, pt denies n/v, abdominal pain  -has been unhappy with tandem pump, he has had several pumps all malfunctions. Currently being told by tandem he would have to pay to have  "current pump fixed. Wants to try to get medtronic pump. Was told my insurance it would be covered if \"authorized by doctor\"  -unable to download pump/freestyle richardson downloaded but was able to review pump settings, currently 12a basal 1.7, IC ratio 1:15, CF 1:48 >115, 6am basal 1.6, IC ratio 1:15, CF 1:48 >115  -pump setting adjustments to get more bolus insulin for carbs and correction- change carb ratio 1:13, change CF 1:35 >120  -contacted belle with tandem and asked him to contact pt as well regarding pump battery, infusion leaking issues  -will have pt do a mychart visit in 1-2 weeks, gave bs log to email to me the day before visit   -will send referral for medtronic 670G pump system, discussed unsure if insurance will actually cover since tandem still under warranty  -did not tolerate pravastatin in the past, will discuss with pt trying another statin next visit    1.  Diet: 3-4 carb servings per meal for females, 4-5 carb servings per meal for males  Spread carb intake throughout the day  Increase lean protein and vegetable intake  Avoid sugary drinks and processed carbs including crackers, cookies, cakes  2.  Exercise: Recommend at least 30 minutes of exercise daily, at least 5 days per week. Increase exercise gradually.   3.  Blood Glucose Goal: Blood glucose goal <150 fasting, <180 2 hr postprandial  4.  Microalbumin due 7/2020  5.  Education performed during this visit: long term diabetic complications discussed. , annual eye examinations at Ophthalmology discussed, dental hygiene discussed  and foot care reviewed., home glucose monitoring emphasized, all medications, side effects and compliance discussed carefully and Hypoglycemia management and prevention reviewed. Reviewed ‘ABCs’ of diabetes management (respective goals in parentheses):  A1C (<7), blood pressure (<130/80), and cholesterol (LDL <100, if CVD <70).    There are no Patient Instructions on file for this visit.    Follow up: Return in about " 2 weeks (around 4/13/2020).    Discussed the nature of the disease including, risks, complications, implications, management, safe and proper use of medications. Encouraged therapeutic lifestyle changes including low calorie diet with plenty of fruits and vegetables, daily exercise, medication compliance, and keeping scheduled follow up appointments with me and any other providers. Encouraged patient to have appointment for complete physical, fasting labs, appropriate screenings, and immunizations on an annual basis.    35 min  of 60 min face-to-face visit time spent for coordination of care and counselling regarding identified problems as outlined in the objective, assessment and discussion portions of the documentation.    Signed by: Kaushal Mata PA-C

## 2020-04-06 ENCOUNTER — DOCUMENTATION (OUTPATIENT)
Dept: ENDOCRINOLOGY | Facility: CLINIC | Age: 40
End: 2020-04-06

## 2020-04-09 ENCOUNTER — TELEPHONE (OUTPATIENT)
Dept: FAMILY MEDICINE CLINIC | Facility: CLINIC | Age: 40
End: 2020-04-09

## 2020-04-09 NOTE — TELEPHONE ENCOUNTER
Yes., he did see pascual( KENRICK) and they will need to handle this.    Electrophysiology Consult Note:     CHIEF COMPLAINT:  Patient is a 47y old  Female who presents with a chief complaint of cardiac arrest after general anesthesia induction (10 May 2019 13:16)        HISTORY OF PRESENT ILLNESS:   HPI:  Patient seen in CCU, intubated/sedated.   46 yo F with PMH uncompensated dCHF, CVA, NSTEMI, HTN, sickle cell trait, and concern for amyloidosis presents for scheduled abdominal fat pad biopsy to confirm diagnosis of amyloidosis.  After induction of general anesthesia and intubation, patient experienced cardiac arrest/PEA. Chest compressions were started and epinephrine was given. ROSC was achieved. Discussion was had with cardiology and decision was made to proceed with the procedure given the likely underlying etiology of her arrest. Patient remained on pressor support throughout the case. Procedure otherwise uneventful. She was then transferred to the CCU post-operatively, intubated and on an epinephrine gtt.         PAST MEDICAL & SURGICAL HISTORY:  CVA (cerebral vascular accident): embolic stroke 1/2019 L side with R side weakness  Heart failure  MI (myocardial infarction)  Cerebrospinal fluid leak  HTN (hypertension)  History of surgery: dilation and evacuation  Adenomatous polyp of colon: polyp removed no cancer of colon  Ventriculomegaly of brain, congenital      FAMILY HISTORY:  Family history of cerebrovascular accident (CVA) (Mother)      SOCIAL HISTORY:    [ x] Non-smoker      Allergies    No Known Allergies    Intolerances    	    MEDICATIONS:  MEDICATIONS  (STANDING):  aspirin  chewable 81 milliGRAM(s) Oral daily  chlorhexidine 0.12% Liquid 15 milliLiter(s) Oral Mucosa every 12 hours  chlorhexidine 2% Cloths 1 Application(s) Topical <User Schedule>  clopidogrel Tablet 75 milliGRAM(s) Oral once  fentaNYL   Infusion. 0.5 MICROgram(s)/kG/Hr (3.955 mL/Hr) IV Continuous <Continuous>  heparin  Injectable 5000 Unit(s) SubCutaneous every 8 hours  midazolam Infusion 0.018 mG/kG/Hr (1.458 mL/Hr) IV Continuous <Continuous>  norepinephrine Infusion 0.05 MICROgram(s)/kG/Min (3.708 mL/Hr) IV Continuous <Continuous>  pantoprazole  Injectable 40 milliGRAM(s) IV Push daily  potassium chloride  20 mEq/100 mL IVPB 20 milliEquivalent(s) IV Intermittent every 2 hours    MEDICATIONS  (PRN):        REVIEW OF SYSTEMS:    Patient is sedated/ intubated unable to provide ROS at this time      PHYSICAL EXAM:  Vital Signs Last 24 Hrs  T(C): 36.6 (10 May 2019 16:50), Max: 36.6 (10 May 2019 16:50)  T(F): 97.8 (10 May 2019 16:50), Max: 97.8 (10 May 2019 16:50)  HR: 94 (10 May 2019 16:30) (86 - 128)  BP: 82/60 (10 May 2019 16:30) (80/55 - 140/108)  BP(mean): 67 (10 May 2019 16:30) (62 - 120)  RR: 12 (10 May 2019 16:30) (0 - 16)  SpO2: 99% (10 May 2019 16:30) (80% - 100%)  Daily     Daily     Constitutional: NAD	  HEENT:   PERRL, EOMI	  CVS: S1 S2, No JVD  Pulm: Lungs clear to auscultation	  GI:  Soft, Non-tender, + BS	  Ext: No LE edema  Vascular: Peripheral pulses palpable  MS: full range of motion in all joints  Neurologic: sedated   Skin: No rash or lesion       	  LABS:	                         15.5   14.46 )-----------( 308      ( 10 May 2019 10:19 )             45.5     05-10    141  |  102  |  19  ----------------------------<  215<H>  3.2<L>   |  25  |  0.91    Ca    9.8      10 May 2019 10:19  Phos  4.6     05-10  Mg     1.7     05-10    TPro  6.1  /  Alb  3.2<L>  /  TBili  1.1  /  DBili  x   /  AST  47<H>  /  ALT  62<H>  /  AlkPhos  100  05-10    proBNP:   Lipid Profile:   HgA1c:   TSH: 	    Telemetry: sinus rhythm    Echo: < from: Echocardiogram (05.10.19 @ 14:50) >  The left atrial size is normal. Right atrial size is normal.No evidence   for   interatrial shunt by color Doppler assessment.There is mild aortic valve   thickening. No aortic regurgitation noted. No hemodynamically significant   valvular aortic stenosis.  There is mild mitral valve thickening. There   is   mild to moderate mitral regurgitation.  There is mild tricuspid valve   thickening. There is moderate tricuspid regurgitation.  The pulmonary   artery   systolic pressure is estimatedto be 49 mmHg.The pulmonic valve is not   well   visualized. There is trace pulmonic regurgitation.  The right ventricle   is   normal in size. RV function appears reduced.There is severe concentric   left   ventricular hypertrophy. There is moderateglobal hypokinesis of the left   ventricle. The left ventricular ejection fraction is 40%.  No aortic root   dilatation.A trivial pericardial effusion noted.Ascites noted.

## 2020-04-09 NOTE — TELEPHONE ENCOUNTER
PT STATES THAT A FAX FROM  [employer] WILL BE COMING PLEASE BE ON THE LOOK OUT.  PT IS REQUESTING PAPER TO BE FILLED OUT   AND FAXED BACK.     REFERENCE NUMBER: [id number]      PLEASE ADVISE PT @ [phone]

## 2020-04-09 NOTE — TELEPHONE ENCOUNTER
Called spoke with annie. She stated she just needs for us to fax this stated that they did go to endo and they had gave for to fax but she has no way to fax papers. Stated she would call back with another number to fax to and references number.

## 2020-06-30 ENCOUNTER — TELEPHONE (OUTPATIENT)
Dept: ENDOCRINOLOGY | Facility: CLINIC | Age: 40
End: 2020-06-30

## 2020-06-30 NOTE — TELEPHONE ENCOUNTER
Called pt to see if he is able to attend his video visit today. He was attending a closing for his house and was unable to attend. Please call him to reschedule his appt.

## 2020-07-10 DIAGNOSIS — E10.65 UNCONTROLLED TYPE 1 DIABETES MELLITUS WITH HYPERGLYCEMIA (HCC): ICD-10-CM

## 2020-07-13 ENCOUNTER — OFFICE VISIT (OUTPATIENT)
Dept: INTERNAL MEDICINE | Facility: CLINIC | Age: 40
End: 2020-07-13

## 2020-07-13 VITALS
SYSTOLIC BLOOD PRESSURE: 132 MMHG | HEIGHT: 71 IN | WEIGHT: 198 LBS | BODY MASS INDEX: 27.72 KG/M2 | OXYGEN SATURATION: 99 % | HEART RATE: 87 BPM | TEMPERATURE: 98.9 F | DIASTOLIC BLOOD PRESSURE: 85 MMHG

## 2020-07-13 DIAGNOSIS — E10.65 UNCONTROLLED TYPE 1 DIABETES MELLITUS WITH HYPERGLYCEMIA (HCC): Primary | ICD-10-CM

## 2020-07-13 DIAGNOSIS — R79.89 LOW TESTOSTERONE: ICD-10-CM

## 2020-07-13 DIAGNOSIS — E78.2 MIXED HYPERLIPIDEMIA: ICD-10-CM

## 2020-07-13 PROBLEM — E10.40 DM NEUROPATHY, TYPE I DIABETES MELLITUS (HCC): Status: RESOLVED | Noted: 2019-12-26 | Resolved: 2020-07-13

## 2020-07-13 PROCEDURE — 99214 OFFICE O/P EST MOD 30 MIN: CPT | Performed by: FAMILY MEDICINE

## 2020-07-13 NOTE — PROGRESS NOTES
Robert Barron is a 40 y.o. male.    Chief Complaint   Patient presents with   • Diabetes       HPI   Patient presents to [facility] care.  Patient has had diabetes type 1. . He has been compliant with the medications and denies any side effects from it. He has been monitoring fingersticks.  his fingerstick range is low 100's fasting. He is on an insulin pump that was apparently managed by last family physician.  He has seen endocrinology at two separate office in [place], but was frustrated that he only saw a midlevel instead of a physician.  He was not comfortable going back to [facility] endocrinology as he states they had difficulty obtaining his glucose readings from his pump.  He was supposed to do a video visit with them, but was unable to do so due to moving to [place].  He does not report hypoglycemic symptoms. He has been following a diabetic diet and has been active.  his last eye exam was greater than a year ago .  He states he has yearly exams through work at [employer].    Patient does have hyperlipidemia as well.  He was previously on pravastatin.  He is not sure that he had any reaction to pravastatin, but it is listed as a medication that caused apparent memory loss.  Patient is unaware of this.  He does try to eat a healthy diet and is active, as listed above.  He is due for follow-up labs today.    Patient also has a history of borderline low testosterone.  He admits to some fatigue.  He does not report any erectile dysfunction.  However, he would like his testosterone rechecked today.    The following portions of the patient's history were reviewed and updated as appropriate: allergies, current medications, past family history, past medical history, past social history, past surgical history and problem list.     Past Medical History:   Diagnosis Date   • Diabetes with ketoacidosis (CMS/HCC)    • Hyperlipidemia        Past Surgical History:   Procedure Laterality Date   • NO PAST SURGERIES     •  WISDOM TOOTH EXTRACTION         Family History   Problem Relation Age of Onset   • Heart attack Father    • Stroke Sister    • Heart attack Mother    • Liver disease Mother    • Diabetes Mother        Social History     Socioeconomic History   • Marital status:      Spouse name: Not on file   • Number of children: Not on file   • Years of education: Not on file   • Highest education level: Not on file   Tobacco Use   • Smoking status: Former Smoker     Last attempt to quit: 2005     Years since quitting: 15.5   • Smokeless tobacco: Current User     Types: Snuff   Substance and Sexual Activity   • Alcohol use: No   • Drug use: No   • Sexual activity: Yes     Partners: Female       Allergies   Allergen Reactions   • Pravastatin Other (See Comments)     Memory loss         Current Outpatient Medications:   •  Continuous Blood Gluc  (FREESTYLE ROSAMARIA 14 DAY READER) device, 1 Device Daily., Disp: 1 Device, Rfl: 5  •  Continuous Blood Gluc Sensor (FREESTYLE ROSAMARIA 14 DAY SENSOR) misc, 1 Device Every 14 (Fourteen) Days., Disp: 2 each, Rfl: 5  •  glucose blood test strip, Test glucose QID, Disp: 100 each, Rfl: 11  •  insulin aspart (NovoLOG) 100 UNIT/ML injection, USE AS DIRECTED IN  INSULIN  PUMP  **MAX  OF  200  UNITS  PER  DAY**, Disp: 60 mL, Rfl: 1    ROS    Review of Systems   Constitutional: Positive for fatigue. Negative for chills, fever and unexpected weight loss.   HENT: Negative for congestion, postnasal drip and sore throat.    Eyes: Negative for blurred vision and visual disturbance.   Respiratory: Negative for cough, shortness of breath and wheezing.    Cardiovascular: Negative for chest pain and leg swelling.   Gastrointestinal: Negative for abdominal pain, constipation, diarrhea, nausea and vomiting.   Endocrine: Negative for cold intolerance, heat intolerance, polydipsia, polyphagia and polyuria.   Genitourinary: Negative for dysuria and frequency.   Musculoskeletal: Negative for arthralgias  "and back pain.   Skin: Negative for color change, pallor and rash.   Allergic/Immunologic: Negative for environmental allergies.   Neurological: Negative for dizziness, tremors, seizures, speech difficulty, weakness, numbness, headache and confusion.   Hematological: Does not bruise/bleed easily.   Psychiatric/Behavioral: Negative for depressed mood. The patient is not nervous/anxious.        Vitals:    07/13/20 1352   BP: 132/85   BP Location: Left arm   Patient Position: Sitting   Cuff Size: Adult   Pulse: 87   Temp: 98.9 °F (37.2 °C)   TempSrc: Temporal   SpO2: 99%   Weight: 89.8 kg (198 lb)   Height: 180.3 cm (71\")     Body mass index is 27.62 kg/m².    Physical Exam     Physical Exam   Constitutional: He is oriented to person, place, and time. He appears well-developed and well-nourished. No distress.   HENT:   Head: Normocephalic and atraumatic.   Right Ear: External ear normal.   Left Ear: External ear normal.   Eyes: Conjunctivae and EOM are normal.   Cardiovascular: Normal rate and regular rhythm.   No murmur heard.  Pulmonary/Chest: Effort normal and breath sounds normal. No respiratory distress. He has no wheezes.   Abdominal: Soft. Bowel sounds are normal. He exhibits no distension. There is no tenderness.   Musculoskeletal: He exhibits no edema.   Neurological: He is alert and oriented to person, place, and time. No cranial nerve deficit.   Skin: Skin is warm and dry.   Psychiatric: He has a normal mood and affect. His behavior is normal.       Assessment/Plan    Problem List Items Addressed This Visit        Cardiovascular and Mediastinum    Hyperlipidemia     Discussed with patient his last LDL was elevated for a diabetic.  If remains elevated may consider starting back on a cholesterol-lowering agent.  Advised patient this is typically done for preventative reasons for diabetics in addition to treatment.         Relevant Orders    Lipid Panel       Endocrine    Uncontrolled type 1 diabetes mellitus " (CMS/[facility]) - Primary     Patient appears to be stable based on glucose readings.  Will obtain updated A1c and check microalbumin every few months.  He is to continue with NovoLog administered through an insulin pump.  Patient has been encouraged to see endocrinology.  Advised that there is another office within [place] at [facility].  Encouraged to notify the office if he would like a referral to the office.  Also advised there are several physicians within both offices that he has been to in the past, Al and Teresa.  He may certainly request to see 1 of the physicians if he is not happy with 1 of the midlevel's.  Advised that family medicine does not typically monitor and make adjustments to insulin pumps.         Relevant Orders    CBC & Differential    Comprehensive Metabolic Panel    Hemoglobin A1c    Lipid Panel    Microalbumin / Creatinine Urine Ratio - Urine, Clean Catch       Other    Low testosterone    Relevant Orders    Testosterone, Free, Total          No orders of the defined types were placed in this encounter.      No orders of the defined types were placed in this encounter.      Return in about 3 months (around 10/13/2020) for diabetes.      Gayathri Li,

## 2020-07-13 NOTE — ASSESSMENT & PLAN NOTE
Patient appears to be stable based on glucose readings.  Will obtain updated A1c and check microalbumin every few months.  He is to continue with NovoLog administered through an insulin pump.  Patient has been encouraged to see endocrinology.  Advised that there is another office within [place] at [facility].  Encouraged to notify the office if he would like a referral to the office.  Also advised there are several physicians within both offices that he has been to in the past, Al and Teresa.  He may certainly request to see 1 of the physicians if he is not happy with 1 of the midlevel's.  Advised that family medicine does not typically monitor and make adjustments to insulin pumps.

## 2020-07-13 NOTE — ASSESSMENT & PLAN NOTE
Discussed with patient his last LDL was elevated for a diabetic.  If remains elevated may consider starting back on a cholesterol-lowering agent.  Advised patient this is typically done for preventative reasons for diabetics in addition to treatment.

## 2020-07-15 LAB
ALBUMIN SERPL-MCNC: 4.4 G/DL (ref 3.5–5.2)
ALBUMIN/CREAT UR: <5 MG/G CREAT (ref 0–29)
ALBUMIN/GLOB SERPL: 2.2 G/DL
ALP SERPL-CCNC: 96 U/L (ref 39–117)
ALT SERPL-CCNC: 14 U/L (ref 1–41)
AST SERPL-CCNC: 7 U/L (ref 1–40)
BASOPHILS # BLD AUTO: 0.05 10*3/MM3 (ref 0–0.2)
BASOPHILS NFR BLD AUTO: 0.9 % (ref 0–1.5)
BILIRUB SERPL-MCNC: 0.2 MG/DL (ref 0–1.2)
BUN SERPL-MCNC: 12 MG/DL (ref 6–20)
BUN/CREAT SERPL: 10.9 (ref 7–25)
CALCIUM SERPL-MCNC: 9 MG/DL (ref 8.6–10.5)
CHLORIDE SERPL-SCNC: 103 MMOL/L (ref 98–107)
CHOLEST SERPL-MCNC: 153 MG/DL (ref 0–200)
CO2 SERPL-SCNC: 26.9 MMOL/L (ref 22–29)
CREAT SERPL-MCNC: 1.1 MG/DL (ref 0.76–1.27)
CREAT UR-MCNC: 64.4 MG/DL
EOSINOPHIL # BLD AUTO: 0.18 10*3/MM3 (ref 0–0.4)
EOSINOPHIL NFR BLD AUTO: 3.4 % (ref 0.3–6.2)
ERYTHROCYTE [DISTWIDTH] IN BLOOD BY AUTOMATED COUNT: 12.3 % (ref 12.3–15.4)
GLOBULIN SER CALC-MCNC: 2 GM/DL
GLUCOSE SERPL-MCNC: 305 MG/DL (ref 65–99)
HBA1C MFR BLD: 7.4 % (ref 4.8–5.6)
HCT VFR BLD AUTO: 44.4 % (ref 37.5–51)
HDLC SERPL-MCNC: 32 MG/DL (ref 40–60)
HGB BLD-MCNC: 14.9 G/DL (ref 13–17.7)
IMM GRANULOCYTES # BLD AUTO: 0.01 10*3/MM3 (ref 0–0.05)
IMM GRANULOCYTES NFR BLD AUTO: 0.2 % (ref 0–0.5)
LDLC SERPL CALC-MCNC: 93 MG/DL (ref 0–100)
LYMPHOCYTES # BLD AUTO: 1.3 10*3/MM3 (ref 0.7–3.1)
LYMPHOCYTES NFR BLD AUTO: 24.6 % (ref 19.6–45.3)
MCH RBC QN AUTO: 30.7 PG (ref 26.6–33)
MCHC RBC AUTO-ENTMCNC: 33.6 G/DL (ref 31.5–35.7)
MCV RBC AUTO: 91.4 FL (ref 79–97)
MICROALBUMIN UR-MCNC: <3 UG/ML
MONOCYTES # BLD AUTO: 0.57 10*3/MM3 (ref 0.1–0.9)
MONOCYTES NFR BLD AUTO: 10.8 % (ref 5–12)
NEUTROPHILS # BLD AUTO: 3.18 10*3/MM3 (ref 1.7–7)
NEUTROPHILS NFR BLD AUTO: 60.1 % (ref 42.7–76)
NRBC BLD AUTO-RTO: 0 /100 WBC (ref 0–0.2)
PLATELET # BLD AUTO: 382 10*3/MM3 (ref 140–450)
POTASSIUM SERPL-SCNC: 5.2 MMOL/L (ref 3.5–5.2)
PROT SERPL-MCNC: 6.4 G/DL (ref 6–8.5)
RBC # BLD AUTO: 4.86 10*6/MM3 (ref 4.14–5.8)
SODIUM SERPL-SCNC: 138 MMOL/L (ref 136–145)
TRIGL SERPL-MCNC: 139 MG/DL (ref 0–150)
VLDLC SERPL CALC-MCNC: 27.8 MG/DL
WBC # BLD AUTO: 5.29 10*3/MM3 (ref 3.4–10.8)

## 2020-07-17 LAB
TESTOST FREE SERPL-MCNC: 8.2 PG/ML (ref 6.8–21.5)
TESTOST SERPL-MCNC: 310 NG/DL (ref 264–916)

## 2020-08-31 ENCOUNTER — TELEPHONE (OUTPATIENT)
Dept: FAMILY MEDICINE CLINIC | Facility: CLINIC | Age: 40
End: 2020-08-31

## 2020-08-31 NOTE — TELEPHONE ENCOUNTER
NATHAN  [employer] CALLING TO CHECK STATUS OF LIFE INSURANCE QUESTIONAIRE FAXED ON   8/21/20    [phone]-ELO OR WHOMEVER ANSWERS THE LINE

## 2020-08-31 NOTE — TELEPHONE ENCOUNTER
Please call, I no longer see him and he sees another [facility] MD so the info is going to be the same if they have requested from his current MD

## 2020-09-09 ENCOUNTER — TELEPHONE (OUTPATIENT)
Dept: INTERNAL MEDICINE | Facility: CLINIC | Age: 40
End: 2020-09-09

## 2020-09-09 NOTE — TELEPHONE ENCOUNTER
Caller: ANGELA    Relationship: Other    Best call back number: [phone]    What form or medical record are you requesting: [employer] DR. ARREDONDO    Who is requesting this form or medical record from you: [employer]    How would you like to receive the form or medical records (pick-up, mail, fax): FAX  If fax, what is the fax number: [phone]  If mail, what is the address:   If pick-up, provide patient with address and location details    Timeframe paperwork needed: ASAP, SENT REQUEST ON 09/01/20 AND 09/04/20    Additional notes: QUESTIONNAIRE FOR LIFE INSURANCE

## 2020-09-10 NOTE — TELEPHONE ENCOUNTER
It has been completed.  I didn't know what the paperwork was for and had put a note on it asking you to contact patient to see why it was sent.

## 2020-10-15 ENCOUNTER — OFFICE VISIT (OUTPATIENT)
Dept: INTERNAL MEDICINE | Facility: CLINIC | Age: 40
End: 2020-10-15

## 2020-10-15 VITALS
DIASTOLIC BLOOD PRESSURE: 80 MMHG | HEIGHT: 71 IN | WEIGHT: 195.6 LBS | HEART RATE: 87 BPM | BODY MASS INDEX: 27.38 KG/M2 | SYSTOLIC BLOOD PRESSURE: 140 MMHG | TEMPERATURE: 97.8 F | OXYGEN SATURATION: 98 %

## 2020-10-15 DIAGNOSIS — E10.65 UNCONTROLLED TYPE 1 DIABETES MELLITUS WITH HYPERGLYCEMIA (HCC): Primary | ICD-10-CM

## 2020-10-15 LAB — HBA1C MFR BLD: 8.1 %

## 2020-10-15 PROCEDURE — 99213 OFFICE O/P EST LOW 20 MIN: CPT | Performed by: FAMILY MEDICINE

## 2020-10-15 PROCEDURE — 83036 HEMOGLOBIN GLYCOSYLATED A1C: CPT | Performed by: FAMILY MEDICINE

## 2020-10-15 NOTE — PROGRESS NOTES
Robert Barron is a 40 y.o. male.    Chief Complaint   Patient presents with   • Diabetes       HPI   Patient has had diabetes for the past few years. He has been compliant with the medications and denies any side effects from it. He has been monitoring fingersticks.  his fingerstick range is between .  He has had hypoglycemic symptoms. He has been following a diabetic diet and has been active.  his last eye exam was greater than a year ago .  He states his A1C will probably be elevated.  Glucose readings have been higher than normal here lately as he is doing a different job at work that he is used to and causes some increased stress.  Denies any concern with anxiety.     The following portions of the patient's history were reviewed and updated as appropriate: allergies, current medications, past family history, past medical history, past social history, past surgical history and problem list.     Allergies   Allergen Reactions   • Pravastatin Other (See Comments)     Memory loss         Current Outpatient Medications:   •  Continuous Blood Gluc  (FREESTYLE ROSAMARIA 14 DAY READER) device, 1 Device Daily., Disp: 1 Device, Rfl: 5  •  Continuous Blood Gluc Sensor (FREESTYLE ROSAMARIA 14 DAY SENSOR) mis, 1 Device Every 14 (Fourteen) Days., Disp: 2 each, Rfl: 5  •  glucose blood test strip, Test glucose QID, Disp: 100 each, Rfl: 11  •  insulin aspart (NovoLOG) 100 UNIT/ML injection, USE AS DIRECTED IN  INSULIN  PUMP  **MAX  OF  200  UNITS  PER  DAY**, Disp: 60 mL, Rfl: 1    ROS    Review of Systems   Constitutional: Negative for chills, fatigue and fever.   HENT: Negative for congestion, postnasal drip and sore throat.    Respiratory: Negative for cough, shortness of breath and wheezing.    Cardiovascular: Negative for chest pain.   Gastrointestinal: Negative for abdominal pain, constipation, diarrhea, nausea and vomiting.   Neurological: Negative for weakness, numbness and headache.   Psychiatric/Behavioral:  "Negative for depressed mood. The patient is not nervous/anxious.        Vitals:    10/15/20 1607   BP: 140/80   BP Location: Left arm   Patient Position: Sitting   Cuff Size: Adult   Pulse: 87   Temp: 97.8 °F (36.6 °C)   TempSrc: Temporal   SpO2: 98%   Weight: 88.7 kg (195 lb 9.6 oz)   Height: 180.3 cm (71\")     Body mass index is 27.28 kg/m².    Physical Exam     Physical Exam  Constitutional:       General: He is not in acute distress.     Appearance: He is well-developed.   HENT:      Head: Normocephalic and atraumatic.      Right Ear: External ear normal.      Left Ear: External ear normal.   Eyes:      Extraocular Movements: Extraocular movements intact.      Conjunctiva/sclera: Conjunctivae normal.   Cardiovascular:      Rate and Rhythm: Normal rate and regular rhythm.      Heart sounds: No murmur.   Pulmonary:      Effort: Pulmonary effort is normal. No respiratory distress.      Breath sounds: Normal breath sounds. No wheezing.   Abdominal:      General: Bowel sounds are normal. There is no distension.      Palpations: Abdomen is soft.      Tenderness: There is no abdominal tenderness.   Lymphadenopathy:      Cervical: No cervical adenopathy.   Skin:     General: Skin is warm and dry.   Neurological:      Mental Status: He is alert and oriented to person, place, and time.      Cranial Nerves: No cranial nerve deficit.   Psychiatric:         Mood and Affect: Mood normal.         Behavior: Behavior normal.         Assessment/Plan    Problems Addressed this Visit        Endocrine    Uncontrolled type 1 diabetes mellitus (CMS/HCC) - Primary     Uncontrolled.  A1C today is 8.1  Patient will increase basal to 1.7 for entire day.  No changes to bolus or carb ratio.  Hopefully will see improvement as well once work related stress has resolved.  He has not been tolerant to statins.  Microalbumin on last check was normal.           Relevant Orders    POC Glycosylated Hemoglobin (Hb A1C) (Completed)          No orders " of the defined types were placed in this encounter.      No orders of the defined types were placed in this encounter.      Return in about 3 months (around 1/15/2021) for diabetes.    Gayathri Li, DO

## 2020-10-16 NOTE — ASSESSMENT & PLAN NOTE
Uncontrolled.  A1C today is 8.1  Patient will increase basal to 1.7 for entire day.  No changes to bolus or carb ratio.  Hopefully will see improvement as well once work related stress has resolved.  He has not been tolerant to statins.  Microalbumin on last check was normal.

## 2021-01-15 ENCOUNTER — OFFICE VISIT (OUTPATIENT)
Dept: INTERNAL MEDICINE | Facility: CLINIC | Age: 41
End: 2021-01-15

## 2021-01-15 VITALS
BODY MASS INDEX: 30.03 KG/M2 | TEMPERATURE: 98.9 F | HEART RATE: 86 BPM | RESPIRATION RATE: 18 BRPM | DIASTOLIC BLOOD PRESSURE: 75 MMHG | SYSTOLIC BLOOD PRESSURE: 145 MMHG | OXYGEN SATURATION: 99 % | HEIGHT: 71 IN | WEIGHT: 214.5 LBS

## 2021-01-15 DIAGNOSIS — R03.0 ELEVATED BP WITHOUT DIAGNOSIS OF HYPERTENSION: ICD-10-CM

## 2021-01-15 DIAGNOSIS — E10.65 UNCONTROLLED TYPE 1 DIABETES MELLITUS WITH HYPERGLYCEMIA (HCC): Primary | ICD-10-CM

## 2021-01-15 LAB — HBA1C MFR BLD: 8.2 %

## 2021-01-15 PROCEDURE — 99213 OFFICE O/P EST LOW 20 MIN: CPT | Performed by: FAMILY MEDICINE

## 2021-01-15 PROCEDURE — 83036 HEMOGLOBIN GLYCOSYLATED A1C: CPT | Performed by: FAMILY MEDICINE

## 2021-01-15 NOTE — PROGRESS NOTES
Robert Barron is a 40 y.o. male.    Chief Complaint   Patient presents with   • Diabetes     Pt states his pump is not working again so he has not been getting any insulin.        HPI   Patient has had diabetes for the past few years. He has been compliant with the medications and denies any side effects from it. He has been monitoring fingersticks.  his fingerstick range is between 140-230.  He has not had hypoglycemic symptoms. He has been following a diabetic diet and has been active.  his last eye exam was less than a year ago .  Patient reports that his insulin pump has recently malfunctioned and he does not believe that he has been receiving the correct amount of insulin.  He has requested another pump through [facility] with a continuous sensor.    The following portions of the patient's history were reviewed and updated as appropriate: allergies, current medications, past family history, past medical history, past social history, past surgical history and problem list.     Allergies   Allergen Reactions   • Pravastatin Other (See Comments)     Memory loss         Current Outpatient Medications:   •  Continuous Blood Gluc  (FREESTYLE ROSAMARIA 14 DAY READER) device, 1 Device Daily., Disp: 1 Device, Rfl: 5  •  Continuous Blood Gluc Sensor (FREESTYLE ROSAMARIA 14 DAY SENSOR) mis, 1 Device Every 14 (Fourteen) Days., Disp: 2 each, Rfl: 5  •  glucose blood test strip, Test glucose QID, Disp: 100 each, Rfl: 11  •  insulin aspart (NovoLOG) 100 UNIT/ML injection, USE AS DIRECTED IN  INSULIN  PUMP  **MAX  OF  200  UNITS  PER  DAY**, Disp: 60 mL, Rfl: 1    ROS    Review of Systems   Constitutional: Negative for chills, fatigue and fever.   HENT: Negative for congestion.    Respiratory: Negative for cough and shortness of breath.    Cardiovascular: Negative for chest pain.   Gastrointestinal: Negative for abdominal pain, constipation, diarrhea, nausea and vomiting.       Vitals:    01/15/21 1608 01/15/21 1640   BP:  "143/91 145/75   Pulse: 86    Resp: 18    Temp: 98.9 °F (37.2 °C)    TempSrc: Temporal    SpO2: 99%    Weight: 97.3 kg (214 lb 8 oz)    Height: 180.3 cm (70.98\")      Body mass index is 29.93 kg/m².    Physical Exam     Physical Exam  Constitutional:       General: He is not in acute distress.     Appearance: He is well-developed.   HENT:      Head: Normocephalic and atraumatic.      Right Ear: External ear normal.      Left Ear: External ear normal.   Eyes:      Extraocular Movements: Extraocular movements intact.      Conjunctiva/sclera: Conjunctivae normal.   Cardiovascular:      Rate and Rhythm: Normal rate and regular rhythm.      Heart sounds: No murmur.   Pulmonary:      Effort: Pulmonary effort is normal. No respiratory distress.      Breath sounds: Normal breath sounds. No wheezing.   Abdominal:      General: Bowel sounds are normal. There is no distension.      Palpations: Abdomen is soft.      Tenderness: There is no abdominal tenderness.   Musculoskeletal:      Right lower leg: No edema.      Left lower leg: No edema.   Skin:     General: Skin is warm and dry.   Neurological:      Mental Status: He is alert and oriented to person, place, and time.      Cranial Nerves: No cranial nerve deficit.   Psychiatric:         Mood and Affect: Mood normal.         Behavior: Behavior normal.         Assessment/Plan    Problem List Items Addressed This Visit        Cardiac and Vasculature    Elevated BP without diagnosis of hypertension    Current Assessment & Plan     Patient to return in 1 month for recheck.  If no resolution will start antihypertensive.             Endocrine and Metabolic    Uncontrolled type 1 diabetes mellitus (CMS/HCC) - Primary    Current Assessment & Plan     Uncontrolled.  A1C today is 8.2.  Paperwork has been filled out today for a new Medtronic insulin pump.  He will continue with current settings for new pump.  He has not been tolerant to statins.  We will continue to monitor A1c and " microalbumin every few months.         Relevant Orders    POC Glycosylated Hemoglobin (Hb A1C) (Completed)        No orders of the defined types were placed in this encounter.      No orders of the defined types were placed in this encounter.      Return in about 1 month (around 2/15/2021) for HTN.    Gayathri Li DO

## 2021-01-15 NOTE — ASSESSMENT & PLAN NOTE
Uncontrolled.  A1C today is 8.2.  Paperwork has been filled out today for a new Medtronic insulin pump.  He will continue with current settings for new pump.  He has not been tolerant to statins.  We will continue to monitor A1c and microalbumin every few months.

## 2021-01-19 ENCOUNTER — TELEPHONE (OUTPATIENT)
Dept: INTERNAL MEDICINE | Facility: CLINIC | Age: 41
End: 2021-01-19

## 2021-01-19 NOTE — TELEPHONE ENCOUNTER
EMILY FROM [employer] NEEDS TO SPEAK TO SOMEONE ABOUT A CMN THAT WAS FAXED TO THEM YESTERDAY ON 01/18/2021.    PLEASE CALL EMILY BACK [phone]

## 2021-02-08 ENCOUNTER — TELEPHONE (OUTPATIENT)
Dept: INTERNAL MEDICINE | Facility: CLINIC | Age: 41
End: 2021-02-08

## 2021-02-08 DIAGNOSIS — E10.65 UNCONTROLLED TYPE 1 DIABETES MELLITUS WITH HYPERGLYCEMIA: ICD-10-CM

## 2021-02-08 NOTE — TELEPHONE ENCOUNTER
They are not quite the same medication.  They are both rapid acting insulins.  Typically if they do not cover novolog they will cover humalog.  However, he cannot take humalog.  Please ask if he can take admelog.

## 2021-02-08 NOTE — TELEPHONE ENCOUNTER
PTS WIFE CALLED STATING:  insulin aspart (NovoLOG) 100 UNIT/ML injection  IS NOT COVERED AND IS REQUESTING A PRIOR AUTH    CASE NUMBER: [id number]  PHONE NUMBER: [phone]    SHE IS REQUESTING IT BE EXPEDITED     SHE STATED PT CANNOT TAKE HUMALOG    PT IS COMPLETELY OUT    [facility] [address] [place] [address] [phone] [facility] [phone] FX     PT CALL BACK NUMBER: [phone]

## 2021-02-08 NOTE — TELEPHONE ENCOUNTER
Aren't Novolog and Humalog the same medication ? If Humalog is the formulary they aren't going to pay for Novolog.

## 2021-02-09 ENCOUNTER — TELEPHONE (OUTPATIENT)
Dept: INTERNAL MEDICINE | Facility: CLINIC | Age: 41
End: 2021-02-09

## 2021-02-09 NOTE — TELEPHONE ENCOUNTER
Please re send to [facility] in [place]. It's not going through because they have switched pharmacies.

## 2021-02-09 NOTE — TELEPHONE ENCOUNTER
PT wife said she called insurance and they stated that just to call straight in to them and change it to straight NOVOLOG and that everything else is complete and if they get it today he can have his insulin tonight.  Call this number 5    case number is  [phone]  Pt asked if you could call her back when its completed so that she can call [facility] immediately.

## 2021-02-09 NOTE — TELEPHONE ENCOUNTER
PATIENTS WIFE CALLED BACK AND STATED THAT THE PHARMACY STATED THAT THERE IS AN ISSUE WITH THE PRIOR AUTH BECAUSE THE MEDICATION IS NOT BEING APPROVED BY INSURANCE.     PATIENTS WIFE STATED THAT THE PA HAS TO BE VERY DETAILED AS TO WHY PATIENT CAN NOT USE HUMALOG.    PATIENT NEEDS NOVOLOG ASAP BECAUSE HE HAS NOT HAD IT SINCE 02/04/2021    PLEASE REVIEW AND RESUBMIT THE PA. PATIENTS WIFE STATED SHE WOULD LIKE EVERYTHING TO BE SUBMITTED TO THE PHARMACY LISTED BELOW    ADVISE AND CALL PATIENTS WIFE [phone]    [facility] [address] [place] [address] [phone] [facility] [phone]   [phone]

## 2021-05-07 ENCOUNTER — OFFICE VISIT (OUTPATIENT)
Dept: INTERNAL MEDICINE | Facility: CLINIC | Age: 41
End: 2021-05-07

## 2021-05-07 VITALS
OXYGEN SATURATION: 97 % | TEMPERATURE: 97.3 F | HEIGHT: 71 IN | RESPIRATION RATE: 18 BRPM | WEIGHT: 213.25 LBS | BODY MASS INDEX: 29.85 KG/M2 | DIASTOLIC BLOOD PRESSURE: 75 MMHG | SYSTOLIC BLOOD PRESSURE: 135 MMHG | HEART RATE: 86 BPM

## 2021-05-07 DIAGNOSIS — E10.65 UNCONTROLLED TYPE 1 DIABETES MELLITUS WITH HYPERGLYCEMIA (HCC): Primary | ICD-10-CM

## 2021-05-07 DIAGNOSIS — Z23 NEED FOR DIPHTHERIA-TETANUS-PERTUSSIS (TDAP) VACCINE: ICD-10-CM

## 2021-05-07 DIAGNOSIS — R03.0 ELEVATED BP WITHOUT DIAGNOSIS OF HYPERTENSION: ICD-10-CM

## 2021-05-07 LAB — HBA1C MFR BLD: 7.1 %

## 2021-05-07 PROCEDURE — 90471 IMMUNIZATION ADMIN: CPT | Performed by: FAMILY MEDICINE

## 2021-05-07 PROCEDURE — 83036 HEMOGLOBIN GLYCOSYLATED A1C: CPT | Performed by: FAMILY MEDICINE

## 2021-05-07 PROCEDURE — 99213 OFFICE O/P EST LOW 20 MIN: CPT | Performed by: FAMILY MEDICINE

## 2021-05-07 PROCEDURE — 90715 TDAP VACCINE 7 YRS/> IM: CPT | Performed by: FAMILY MEDICINE

## 2021-05-07 NOTE — ASSESSMENT & PLAN NOTE
Uncontrolled.  A1C today is 7.1  This is much improved from last visit.   He will continue with current settings for new pump.  He has not been tolerant to statins.  We will continue to monitor A1c and microalbumin every few months.

## 2021-05-07 NOTE — PROGRESS NOTES
"Robert Barron is a 41 y.o. male.    Chief Complaint   Patient presents with   • Hypertension     Pt does not check his BP at home due to him \"being busy\". His wife told him to ask about swelling in his hands and feet. With his job, it is hard to tell if his body is bothered by this.    • Diabetes     Pt has a GCM that checks his sugar regularly. Has a reading over 200 about 5 times a week. In the last month he has had readings over 400, but was able to get it back down.        HPI   Patient has had diabetes for the past few years. He has been compliant with the medications and denies any side effects from it. He has been monitoring fingersticks.  his fingerstick range is between .  He has had hypoglycemic symptoms. He has been following a diabetic diet and has been active.  his last eye exam was less than a year ago . He is doing well with his new insulin pump.     Patient had elevated BP at last couple of visits.  BP controlled in office today.      The following portions of the patient's history were reviewed and updated as appropriate: allergies, current medications, past family history, past medical history, past social history, past surgical history and problem list.     Allergies   Allergen Reactions   • Pravastatin Other (See Comments)     Memory loss         Current Outpatient Medications:   •  Continuous Blood Gluc  (FREESTYLE ROSAMARIA 14 DAY READER) device, 1 Device Daily., Disp: 1 Device, Rfl: 5  •  Continuous Blood Gluc Sensor (FREESTYLE ROSAMARIA 14 DAY SENSOR) misc, 1 Device Every 14 (Fourteen) Days., Disp: 2 each, Rfl: 5  •  glucose blood test strip, Test glucose QID, Disp: 100 each, Rfl: 11  •  insulin aspart (NovoLOG) 100 UNIT/ML injection, USE AS DIRECTED IN  INSULIN  PUMP  **MAX  OF  200  UNITS  PER  DAY**, Disp: 60 mL, Rfl: 5    ROS    Review of Systems   Constitutional: Negative for chills, fatigue and fever.   Respiratory: Negative for cough and shortness of breath.    Cardiovascular: " "Positive for leg swelling. Negative for chest pain.   Gastrointestinal: Negative for abdominal pain, constipation, diarrhea, nausea and vomiting.   Musculoskeletal: Negative for arthralgias.       Vitals:    05/07/21 1603   BP: 135/75   Pulse: 86   Resp: 18   Temp: 97.3 °F (36.3 °C)   TempSrc: Temporal   SpO2: 97%   Weight: 96.7 kg (213 lb 4 oz)   Height: 180.3 cm (70.98\")     Body mass index is 29.76 kg/m².    Physical Exam     Physical Exam  Constitutional:       General: He is not in acute distress.     Appearance: Normal appearance. He is well-developed.   HENT:      Head: Normocephalic and atraumatic.      Right Ear: External ear normal.      Left Ear: External ear normal.   Eyes:      Extraocular Movements: Extraocular movements intact.      Conjunctiva/sclera: Conjunctivae normal.   Cardiovascular:      Rate and Rhythm: Normal rate and regular rhythm.      Heart sounds: No murmur heard.     Pulmonary:      Effort: Pulmonary effort is normal. No respiratory distress.      Breath sounds: Normal breath sounds. No wheezing.   Abdominal:      General: Bowel sounds are normal. There is no distension.      Palpations: Abdomen is soft.      Tenderness: There is no abdominal tenderness.   Musculoskeletal:      Cervical back: Normal range of motion and neck supple.      Right lower leg: Edema (trace) present.      Left lower leg: Edema (trace) present.   Feet:      Comments: Diabetic foot exam:   Left: Filament test present   Pulses Dorsalis Pedis:  present      Right: Filament test present   Pulses Dorsalis Pedis:  present     Lymphadenopathy:      Cervical: No cervical adenopathy.   Skin:     General: Skin is warm and dry.   Neurological:      Mental Status: He is alert and oriented to person, place, and time.      Cranial Nerves: No cranial nerve deficit.   Psychiatric:         Mood and Affect: Mood normal.         Behavior: Behavior normal.         Assessment/Plan    Problems Addressed this Visit        Cardiac and " Vasculature    Elevated BP without diagnosis of hypertension     Resolved              Endocrine and Metabolic    Uncontrolled type 1 diabetes mellitus (CMS/[facility]) - Primary     Uncontrolled.  A1C today is 7.1  This is much improved from last visit.   He will continue with current settings for new pump.  He has not been tolerant to statins.  We will continue to monitor A1c and microalbumin every few months.         Relevant Orders    POC Glycosylated Hemoglobin (Hb A1C) (Completed)      Other Visit Diagnoses     Need for diphtheria-tetanus-pertussis (Tdap) vaccine              No orders of the defined types were placed in this encounter.      Orders Placed This Encounter   Procedures   • Tdap Vaccine Greater Than or Equal To 6yo IM       Return in about 3 months (around 8/7/2021) for diabetes 1.    Gayathri Li,

## 2021-05-12 ENCOUNTER — OFFICE VISIT (OUTPATIENT)
Dept: INTERNAL MEDICINE | Facility: CLINIC | Age: 41
End: 2021-05-12

## 2021-05-12 VITALS
SYSTOLIC BLOOD PRESSURE: 137 MMHG | WEIGHT: 218.5 LBS | TEMPERATURE: 97.8 F | OXYGEN SATURATION: 97 % | HEART RATE: 87 BPM | BODY MASS INDEX: 30.59 KG/M2 | DIASTOLIC BLOOD PRESSURE: 85 MMHG | HEIGHT: 71 IN

## 2021-05-12 DIAGNOSIS — R10.9 FLANK PAIN: Primary | ICD-10-CM

## 2021-05-12 LAB
BILIRUB BLD-MCNC: NEGATIVE MG/DL
CLARITY, POC: ABNORMAL
COLOR UR: ABNORMAL
GLUCOSE UR STRIP-MCNC: ABNORMAL MG/DL
KETONES UR QL: NEGATIVE
LEUKOCYTE EST, POC: NEGATIVE
NITRITE UR-MCNC: NEGATIVE MG/ML
PH UR: 6 [PH] (ref 5–8)
PROT UR STRIP-MCNC: NEGATIVE MG/DL
RBC # UR STRIP: NEGATIVE /UL
SP GR UR: 1.03 (ref 1–1.03)
UROBILINOGEN UR QL: NORMAL

## 2021-05-12 PROCEDURE — 99213 OFFICE O/P EST LOW 20 MIN: CPT | Performed by: NURSE PRACTITIONER

## 2021-05-12 PROCEDURE — 81003 URINALYSIS AUTO W/O SCOPE: CPT | Performed by: NURSE PRACTITIONER

## 2021-05-12 RX ORDER — IBUPROFEN 600 MG/1
600 TABLET ORAL EVERY 6 HOURS PRN
Qty: 30 TABLET | Refills: 0 | Status: SHIPPED | OUTPATIENT
Start: 2021-05-12

## 2021-05-12 NOTE — PROGRESS NOTES
Office Visit      Patient Name: Robert Barron  : 1980   MRN: [id number]   Care Team: Patient Care Team:  Gayathri Li DO as PCP - General (Family Medicine)  Kaushal Mata PA-C as Physician Assistant (Endocrinology)    Chief Complaint  Back Pain (lower right side )    Subjective     Subjective      Robert Barron presents to [facility] for low back pain. Started today when he was working. Bent over to check a level of a vehicle and felt a sharp pain on the right lower side of his back. Never had this type of pain before. Denies dysuria, frequency, urgency, hematuria, history of kidney stones, constipation, radiating pain, numbness/tingling of extremities, saddle anesthesia, history of cancer, and bowel/bladder dysfunction. Has not tried anything for his symptoms. Bending over exacerbates the pain. Relieved by sitting and walking.  After leaving urine sample patient reports sharp pain to the right flank with urination.  Drinks mostly Diet Coke and not a lot of water.    Review of Systems   Constitutional: Negative for fatigue, fever, unexpected weight gain and unexpected weight loss.   HENT: Negative for sore throat and trouble swallowing.    Eyes: Negative for visual disturbance.   Respiratory: Negative for cough, shortness of breath and wheezing.    Cardiovascular: Negative for chest pain, palpitations and leg swelling.   Gastrointestinal: Negative for abdominal pain, blood in stool, constipation, diarrhea and nausea.   Endocrine: Negative for polydipsia, polyphagia and polyuria.   Musculoskeletal: Positive for back pain. Negative for arthralgias and myalgias.   Skin: Negative for rash.   Neurological: Negative for dizziness, weakness, light-headedness and headache.   Psychiatric/Behavioral: Negative for sleep disturbance and depressed mood. The patient is not nervous/anxious.        Objective     Objective   Vital Signs:   /85   Pulse 87   Temp  "97.8 °F (36.6 °C) (Temporal)   Ht 180.3 cm (70.98\")   Wt 99.1 kg (218 lb 8 oz)   SpO2 97%   BMI 30.49 kg/m²     Physical Exam  Vitals and nursing note reviewed.   Constitutional:       General: He is not in acute distress.     Appearance: Normal appearance. He is not toxic-appearing.   Eyes:      Pupils: Pupils are equal, round, and reactive to light.   Neck:      Vascular: No carotid bruit.   Cardiovascular:      Rate and Rhythm: Normal rate and regular rhythm.      Heart sounds: Normal heart sounds. No murmur heard.     Pulmonary:      Effort: Pulmonary effort is normal. No respiratory distress.      Breath sounds: Normal breath sounds. No wheezing.   Abdominal:      General: Bowel sounds are normal. There is no distension.      Palpations: Abdomen is soft.      Tenderness: There is no abdominal tenderness. There is right CVA tenderness. There is no left CVA tenderness.   Musculoskeletal:         General: No tenderness. Normal range of motion.      Cervical back: Normal, normal range of motion and neck supple. No tenderness. No muscular tenderness.      Thoracic back: Normal.      Lumbar back: Normal. Negative right straight leg raise test and negative left straight leg raise test.      Comments:      Skin:     General: Skin is warm and dry.      Findings: No erythema or rash.   Neurological:      General: No focal deficit present.      Mental Status: He is alert.      Motor: No weakness.      Deep Tendon Reflexes: Reflexes normal.   Psychiatric:         Mood and Affect: Mood normal.         Behavior: Behavior normal.       Assessment / Plan      Assessment/Plan   Problem List Items Addressed This Visit     None      Visit Diagnoses     Flank pain    -  Primary    Relevant Orders    POCT urinalysis dipstick, automated (Completed)    Brief Urine Lab Results  (Last result in the past 365 days)      Color   Clarity   Blood   Leuk Est   Nitrite   Protein   CREAT   Urine HCG        05/12/21 1142 Dark Yellow Cloudy " Negative Negative Negative Negative               Strain of lumbar region, initial encounter        Urinalysis negative for any acute infection and no hematuria noted.  Still cannot rule out kidney stone based on history and physical exam.  Does exhibit some right CVA tenderness.  Instructed him to increase water intake and decrease amount of Diet Coke daily.  Can use ibuprofen as needed for pain and moist heat.  Other differentials include right-sided low back strain.  We discussed worsening signs and symptoms in detail today including fever, inability to void, numbness tingling down into the legs, and hematuria.  He will return to the clinic if he develops.           Follow Up   Return if symptoms worsen or fail to improve.  Patient was given instructions and counseling regarding his condition or for health maintenance advice. Please see specific information pulled into the AVS if appropriate.     RADHA Harris  [facility]

## 2021-09-03 ENCOUNTER — TRANSCRIBE ORDERS (OUTPATIENT)
Dept: LAB | Facility: HOSPITAL | Age: 41
End: 2021-09-03

## 2021-09-03 ENCOUNTER — LAB (OUTPATIENT)
Dept: LAB | Facility: HOSPITAL | Age: 41
End: 2021-09-03

## 2021-09-03 DIAGNOSIS — E11.9 DIABETES MELLITUS WITHOUT COMPLICATION (HCC): Primary | ICD-10-CM

## 2021-09-03 DIAGNOSIS — E78.2 MIXED HYPERLIPIDEMIA: ICD-10-CM

## 2021-09-03 DIAGNOSIS — E11.9 DIABETES MELLITUS WITHOUT COMPLICATION (HCC): ICD-10-CM

## 2021-09-03 DIAGNOSIS — R94.6 NONSPECIFIC ABNORMAL RESULTS OF THYROID FUNCTION STUDY: ICD-10-CM

## 2021-09-03 LAB
ALBUMIN SERPL-MCNC: 4.4 G/DL (ref 3.5–5.2)
ALBUMIN/GLOB SERPL: 1.6 G/DL
ALP SERPL-CCNC: 96 U/L (ref 39–117)
ALT SERPL W P-5'-P-CCNC: 18 U/L (ref 1–41)
ANION GAP SERPL CALCULATED.3IONS-SCNC: 10.6 MMOL/L (ref 5–15)
AST SERPL-CCNC: 13 U/L (ref 1–40)
BILIRUB SERPL-MCNC: 0.5 MG/DL (ref 0–1.2)
BUN SERPL-MCNC: 13 MG/DL (ref 6–20)
BUN/CREAT SERPL: 15.9 (ref 7–25)
CALCIUM SPEC-SCNC: 9.1 MG/DL (ref 8.6–10.5)
CHLORIDE SERPL-SCNC: 103 MMOL/L (ref 98–107)
CHOLEST SERPL-MCNC: 194 MG/DL (ref 0–200)
CO2 SERPL-SCNC: 24.4 MMOL/L (ref 22–29)
CREAT SERPL-MCNC: 0.82 MG/DL (ref 0.76–1.27)
DEPRECATED RDW RBC AUTO: 39.6 FL (ref 37–54)
ERYTHROCYTE [DISTWIDTH] IN BLOOD BY AUTOMATED COUNT: 12.2 % (ref 12.3–15.4)
GFR SERPL CREATININE-BSD FRML MDRD: 104 ML/MIN/1.73
GLOBULIN UR ELPH-MCNC: 2.8 GM/DL
GLUCOSE SERPL-MCNC: 98 MG/DL (ref 65–99)
HBV SURFACE AG SERPL QL IA: NORMAL
HCT VFR BLD AUTO: 47.8 % (ref 37.5–51)
HDLC SERPL-MCNC: 34 MG/DL (ref 40–60)
HGB BLD-MCNC: 16.2 G/DL (ref 13–17.7)
LDLC SERPL CALC-MCNC: 144 MG/DL (ref 0–100)
LDLC/HDLC SERPL: 4.21 {RATIO}
MCH RBC QN AUTO: 30.4 PG (ref 26.6–33)
MCHC RBC AUTO-ENTMCNC: 33.9 G/DL (ref 31.5–35.7)
MCV RBC AUTO: 89.7 FL (ref 79–97)
PLATELET # BLD AUTO: 430 10*3/MM3 (ref 140–450)
PMV BLD AUTO: 9.4 FL (ref 6–12)
POTASSIUM SERPL-SCNC: 4.1 MMOL/L (ref 3.5–5.2)
PROT SERPL-MCNC: 7.2 G/DL (ref 6–8.5)
RBC # BLD AUTO: 5.33 10*6/MM3 (ref 4.14–5.8)
SODIUM SERPL-SCNC: 138 MMOL/L (ref 136–145)
TRIGL SERPL-MCNC: 85 MG/DL (ref 0–150)
VLDLC SERPL-MCNC: 16 MG/DL (ref 5–40)
WBC # BLD AUTO: 7.79 10*3/MM3 (ref 3.4–10.8)

## 2021-09-03 PROCEDURE — 85027 COMPLETE CBC AUTOMATED: CPT

## 2021-09-03 PROCEDURE — 87340 HEPATITIS B SURFACE AG IA: CPT

## 2021-09-03 PROCEDURE — 80061 LIPID PANEL: CPT

## 2021-09-03 PROCEDURE — 36415 COLL VENOUS BLD VENIPUNCTURE: CPT

## 2021-09-03 PROCEDURE — 86704 HEP B CORE ANTIBODY TOTAL: CPT

## 2021-09-03 PROCEDURE — 80053 COMPREHEN METABOLIC PANEL: CPT

## 2021-09-04 LAB — HBV CORE AB SERPL QL IA: NEGATIVE

## 2021-12-08 ENCOUNTER — TELEPHONE (OUTPATIENT)
Dept: FAMILY MEDICINE CLINIC | Facility: CLINIC | Age: 41
End: 2021-12-08

## 2021-12-08 DIAGNOSIS — E10.65 UNCONTROLLED TYPE 1 DIABETES MELLITUS WITH HYPERGLYCEMIA (HCC): ICD-10-CM

## 2021-12-08 NOTE — TELEPHONE ENCOUNTER
Caller: Stephanie Pires    Relationship: Emergency Contact    Best call back number: [phone]    Requested Prescriptions:   Requested Prescriptions     Pending Prescriptions Disp Refills   • insulin aspart (NovoLOG) 100 UNIT/ML injection 60 mL 5     Sig: USE AS DIRECTED IN  INSULIN  PUMP  **MAX  OF  200  UNITS  PER  DAY**        Pharmacy where request should be sent: [facility] [address] [place] [address] [phone] [facility] [phone] FX     Additional details provided by patient: PATIENT HAS ONE VIAL LEFT. PATIENTS WIFE STATES THE PATIENT IS A PATIENT OF DR EVANS OF 18 YEARS AND IS A CURRENT PATIENT OF DR EVANS.     Does the patient have less than a 3 day supply:  [x] Yes  [] No    Dario Fuentes Rep   12/08/21 12:42 EST

## 2021-12-21 ENCOUNTER — OFFICE VISIT (OUTPATIENT)
Dept: FAMILY MEDICINE CLINIC | Facility: CLINIC | Age: 41
End: 2021-12-21

## 2021-12-21 VITALS
DIASTOLIC BLOOD PRESSURE: 102 MMHG | TEMPERATURE: 99.1 F | BODY MASS INDEX: 30.8 KG/M2 | HEART RATE: 78 BPM | HEIGHT: 71 IN | RESPIRATION RATE: 18 BRPM | SYSTOLIC BLOOD PRESSURE: 142 MMHG | WEIGHT: 220 LBS

## 2021-12-21 DIAGNOSIS — I10 HYPERTENSION, UNSPECIFIED TYPE: ICD-10-CM

## 2021-12-21 DIAGNOSIS — E10.65 UNCONTROLLED TYPE 1 DIABETES MELLITUS WITH HYPERGLYCEMIA (HCC): Primary | ICD-10-CM

## 2021-12-21 DIAGNOSIS — E78.2 MIXED HYPERLIPIDEMIA: ICD-10-CM

## 2021-12-21 DIAGNOSIS — Z11.59 ENCOUNTER FOR HEPATITIS C SCREENING TEST FOR LOW RISK PATIENT: ICD-10-CM

## 2021-12-21 PROCEDURE — 99214 OFFICE O/P EST MOD 30 MIN: CPT | Performed by: FAMILY MEDICINE

## 2021-12-21 RX ORDER — ROSUVASTATIN CALCIUM 20 MG/1
20 TABLET, COATED ORAL NIGHTLY
Qty: 30 TABLET | Refills: 3 | Status: SHIPPED | OUTPATIENT
Start: 2021-12-21 | End: 2022-05-31 | Stop reason: SDUPTHER

## 2021-12-21 RX ORDER — LISINOPRIL AND HYDROCHLOROTHIAZIDE 25; 20 MG/1; MG/1
1 TABLET ORAL DAILY
Qty: 30 TABLET | Refills: 5 | Status: SHIPPED | OUTPATIENT
Start: 2021-12-21 | End: 2022-05-31 | Stop reason: SDUPTHER

## 2021-12-21 NOTE — PROGRESS NOTES
Subjective   Robert Barron is a 41 y.o. male.     History of Present Illness     He has been on novolog pump for quite a while  It has a glucometer that works   His sugars are high all the time  novolog baseline   His sugars are high all the time though  He has increased his basal rate  He is not on metformin    BP is high today  He has not olimpia on a BP medicine  He notes hard to be consistent with medicine    Also not on a statin  Had SE with pravachol in the past    The following portions of the patient's history were reviewed and updated as appropriate: allergies, current medications, past family history, past medical history, past social history, past surgical history and problem list.    Review of Systems   Constitutional: Negative.    Psychiatric/Behavioral: Negative.        Objective   Physical Exam  Vitals and nursing note reviewed.   Constitutional:       General: He is not in acute distress.     Appearance: Normal appearance. He is well-developed.   Cardiovascular:      Rate and Rhythm: Normal rate and regular rhythm.      Heart sounds: Normal heart sounds.   Pulmonary:      Effort: Pulmonary effort is normal.      Breath sounds: Normal breath sounds.   Neurological:      Mental Status: He is alert and oriented to person, place, and time.   Psychiatric:         Mood and Affect: Mood normal.         Behavior: Behavior normal.         Thought Content: Thought content normal.         Judgment: Judgment normal.         Assessment/Plan   Diagnoses and all orders for this visit:    1. Uncontrolled type 1 diabetes mellitus with hyperglycemia (HCC) (Primary)  -     CBC & Differential  -     Comprehensive Metabolic Panel  -     Hemoglobin A1c  -     insulin aspart (NovoLOG) 100 UNIT/ML injection; USE AS DIRECTED IN  INSULIN  PUMP  **MAX  OF  200  UNITS  PER  DAY**  Dispense: 60 mL; Refill: 2    2. Hypertension, unspecified type  -     lisinopril-hydrochlorothiazide (PRINZIDE,ZESTORETIC) 20-25 MG per tablet; Take  1 tablet by mouth Daily.  Dispense: 30 tablet; Refill: 5  -     CBC & Differential  -     Comprehensive Metabolic Panel    3. Mixed hyperlipidemia  -     rosuvastatin (Crestor) 20 MG tablet; Take 1 tablet by mouth Every Night.  Dispense: 30 tablet; Refill: 3    4. Encounter for hepatitis C screening test for low risk patient  -     Hepatitis C Antibody    Other orders  -     metFORMIN (Glucophage) 1000 MG tablet; 1/2 PO BID 1 week then 1 PO BID  Dispense: 60 tablet; Refill: 3    glucose range 100-300 at home, will add metformin to help better control DM and check labs today, plan f/u in 3 months  Start prinzide for BP control. He will try to take as consistently as positive  Will start crestor for cholesterol and recheck in future    Discussed starting each new medicine one week apart to make sure no tolerability issues.  Pill container also recommended.

## 2021-12-22 LAB
ALBUMIN SERPL-MCNC: 4.4 G/DL (ref 4–5)
ALBUMIN/GLOB SERPL: 1.9 {RATIO} (ref 1.2–2.2)
ALP SERPL-CCNC: 101 IU/L (ref 44–121)
ALT SERPL-CCNC: 22 IU/L (ref 0–44)
AST SERPL-CCNC: 15 IU/L (ref 0–40)
BASOPHILS # BLD AUTO: 0.1 X10E3/UL (ref 0–0.2)
BASOPHILS NFR BLD AUTO: 1 %
BILIRUB SERPL-MCNC: 0.3 MG/DL (ref 0–1.2)
BUN SERPL-MCNC: 15 MG/DL (ref 6–24)
BUN/CREAT SERPL: 17 (ref 9–20)
CALCIUM SERPL-MCNC: 9.2 MG/DL (ref 8.7–10.2)
CHLORIDE SERPL-SCNC: 103 MMOL/L (ref 96–106)
CO2 SERPL-SCNC: 26 MMOL/L (ref 20–29)
CREAT SERPL-MCNC: 0.86 MG/DL (ref 0.76–1.27)
EOSINOPHIL # BLD AUTO: 0.3 X10E3/UL (ref 0–0.4)
EOSINOPHIL NFR BLD AUTO: 3 %
ERYTHROCYTE [DISTWIDTH] IN BLOOD BY AUTOMATED COUNT: 12.3 % (ref 11.6–15.4)
GLOBULIN SER CALC-MCNC: 2.3 G/DL (ref 1.5–4.5)
GLUCOSE SERPL-MCNC: 185 MG/DL (ref 65–99)
HBA1C MFR BLD: 7.5 % (ref 4.8–5.6)
HCT VFR BLD AUTO: 44.8 % (ref 37.5–51)
HCV AB S/CO SERPL IA: <0.1 S/CO RATIO (ref 0–0.9)
HGB BLD-MCNC: 15.1 G/DL (ref 13–17.7)
IMM GRANULOCYTES # BLD AUTO: 0 X10E3/UL (ref 0–0.1)
IMM GRANULOCYTES NFR BLD AUTO: 0 %
LYMPHOCYTES # BLD AUTO: 2.3 X10E3/UL (ref 0.7–3.1)
LYMPHOCYTES NFR BLD AUTO: 27 %
MCH RBC QN AUTO: 30.8 PG (ref 26.6–33)
MCHC RBC AUTO-ENTMCNC: 33.7 G/DL (ref 31.5–35.7)
MCV RBC AUTO: 91 FL (ref 79–97)
MONOCYTES # BLD AUTO: 0.9 X10E3/UL (ref 0.1–0.9)
MONOCYTES NFR BLD AUTO: 11 %
NEUTROPHILS # BLD AUTO: 4.9 X10E3/UL (ref 1.4–7)
NEUTROPHILS NFR BLD AUTO: 58 %
PLATELET # BLD AUTO: 388 X10E3/UL (ref 150–450)
POTASSIUM SERPL-SCNC: 4.4 MMOL/L (ref 3.5–5.2)
PROT SERPL-MCNC: 6.7 G/DL (ref 6–8.5)
RBC # BLD AUTO: 4.91 X10E6/UL (ref 4.14–5.8)
SODIUM SERPL-SCNC: 141 MMOL/L (ref 134–144)
WBC # BLD AUTO: 8.4 X10E3/UL (ref 3.4–10.8)

## 2022-02-16 ENCOUNTER — TELEPHONE (OUTPATIENT)
Dept: FAMILY MEDICINE CLINIC | Facility: CLINIC | Age: 42
End: 2022-02-16

## 2022-02-16 NOTE — TELEPHONE ENCOUNTER
Caller: Stephanie Pires    Relationship: Emergency Contact    Best call back number: [phone]    What was the call regarding:   PATIENT'S (WIFE) STEPHANIE STATED THAT PATIENT GOT A LETTER FOR [employer] AND PATIENT IS A DIABETIC THAT HAS TO HAVE SNACKS THROUGH OUT THE DAY TO KEEP HIS SUGAR FROM DROPPING. PATIENT WOULD LIKE A LETTER FROM ESAU EVANS MD TO STATE THAT HE WOULD NEED EXTRA BREAK TO HAVE SNACKS TO KEEP THE SUGAR FROM DROPPING TO LOW.     Do you require a callback: YES

## 2022-03-07 ENCOUNTER — TELEPHONE (OUTPATIENT)
Dept: FAMILY MEDICINE CLINIC | Facility: CLINIC | Age: 42
End: 2022-03-07

## 2022-03-07 NOTE — TELEPHONE ENCOUNTER
Caller: PranayStephanie    Relationship: Emergency Contact    Best call back number: [phone]    What medications are you currently taking:   Current Outpatient Medications on File Prior to Visit   Medication Sig Dispense Refill   • Continuous Blood Gluc  (FREESTYLE ROSAMARIA 14 DAY READER) device 1 Device Daily. 1 Device 5   • Continuous Blood Gluc Sensor (FREESTYLE ROSAMARIA 14 DAY SENSOR) misc 1 Device Every 14 (Fourteen) Days. 2 each 5   • glucose blood test strip Test glucose  each 11   • ibuprofen (ADVIL,MOTRIN) 600 MG tablet Take 1 tablet by mouth Every 6 (Six) Hours As Needed for Mild Pain  or Moderate Pain . 30 tablet 0   • insulin aspart (NovoLOG) 100 UNIT/ML injection USE AS DIRECTED IN  INSULIN  PUMP  **MAX  OF  200  UNITS  PER  DAY** 60 mL 2   • lisinopril-hydrochlorothiazide (PRINZIDE,ZESTORETIC) 20-25 MG per tablet Take 1 tablet by mouth Daily. 30 tablet 5   • metFORMIN (Glucophage) 1000 MG tablet 1/2 PO BID 1 week then 1 PO BID 60 tablet 3   • rosuvastatin (Crestor) 20 MG tablet Take 1 tablet by mouth Every Night. 30 tablet 3     No current facility-administered medications on file prior to visit.      Which medication are you concerned about: NOVALOG     Who prescribed you this medication: SMITH     What are your concerns: PHARMACY STATED THAT THE MEDICATION NEEDS A PRE-AUTHORIZATION.

## 2022-04-07 NOTE — PROGRESS NOTES
Assessment/Plan     Problem List Items Addressed This Visit        Cardiovascular and Mediastinum    Hyperlipidemia    Relevant Orders    Comprehensive Metabolic Panel (Completed)    Lipid Panel With / Chol / HDL Ratio (Completed)       Endocrine    Uncontrolled type 1 diabetes mellitus - Primary    Relevant Medications    glucose blood test strip    Other Relevant Orders    Comprehensive Metabolic Panel (Completed)    Hemoglobin A1c (Completed)    Lipid Panel With / Chol / HDL Ratio (Completed)           Follow up: Return in about 3 months (around 3/12/2018), or if symptoms worsen or fail to improve.     DISCUSSION  Overall stable and doing well. Continue current medications. Chronic problems noted are stable. Check labs as noted and Return in about 3 months (around 3/12/2018), or if symptoms worsen or fail to improve.       Rec increase basal rate to 1.6 --8 pm to 12 pm and 1,55 all other times.      Further plan once we get labs back.     MEDICATIONS PRESCRIBED  Requested Prescriptions     Signed Prescriptions Disp Refills   • glucose blood test strip 100 each 11     Sig: Test glucose QID              -------------------------------------------    Subjective     Chief Complaint   Patient presents with   • Diabetes     6 month follow up    • Hyperlipidemia   • Med Refill   • Labs Only       Diabetes   He presents for his follow-up diabetic visit. He has type 1 diabetes mellitus. His disease course has been stable. There are no hypoglycemic associated symptoms. Pertinent negatives for diabetes include no blurred vision, no chest pain, no visual change and no weight loss. There are no hypoglycemic complications. Symptoms are improving. There are no diabetic complications. Risk factors for coronary artery disease include dyslipidemia. Current diabetic treatment includes insulin pump. He is compliant with treatment all of the time. His weight is stable. He is following a generally healthy diet. Frequency home blood  "tests: Has continuous glucose. (Average 206 on download from [employer]) An ACE inhibitor/angiotensin II receptor blocker is not being taken. Eye exam is not current.     Hyperlipidemia  Could not take statin  Forgetful with Pravastatin      Past Medical History,Medications, Allergies, and social history was reviewed.    Review of Systems   Constitutional: Negative.  Negative for weight loss.   HENT: Negative.    Eyes: Negative.  Negative for blurred vision.   Respiratory: Negative.    Cardiovascular: Negative.  Negative for chest pain.   Gastrointestinal: Negative.    Endocrine: Negative.    Genitourinary: Negative.    Musculoskeletal: Negative.    Neurological: Negative.    Psychiatric/Behavioral: Negative.        Objective     Vitals:    12/12/17 1610   BP: 120/98   Pulse: 94   Resp: 16   Temp: 97.8 °F (36.6 °C)   TempSrc: Temporal Artery    SpO2: 98%   Weight: 108 kg (238 lb)   Height: 180.3 cm (70.98\")        Physical Exam   Constitutional: He is oriented to person, place, and time. Vital signs are normal. He appears well-developed and well-nourished.   HENT:   Head: Normocephalic and atraumatic.   Right Ear: Hearing, tympanic membrane, external ear and ear canal normal.   Left Ear: Hearing, tympanic membrane, external ear and ear canal normal.   Mouth/Throat: Oropharynx is clear and moist.   Eyes: Conjunctivae, EOM and lids are normal. Pupils are equal, round, and reactive to light.   Neck: Normal range of motion. Neck supple. No thyromegaly present.   Cardiovascular: Normal rate, regular rhythm and normal heart sounds.  Exam reveals no friction rub.    No murmur heard.  Pulmonary/Chest: Effort normal and breath sounds normal. No respiratory distress. He has no wheezes. He has no rales.   Abdominal: Soft. Normal appearance and bowel sounds are normal. He exhibits no distension and no mass. There is no tenderness. There is no rebound and no guarding.   Musculoskeletal: He exhibits no edema.   Neurological: He is alert " and oriented to person, place, and time. He has normal strength.   Skin: Skin is warm and dry.   Psychiatric: He has a normal mood and affect. His speech is normal and behavior is normal. Cognition and memory are normal.   Nursing note and vitals reviewed.              Carlitos Whitfield MD     no chest pain, no shortness of breath/no diarrhea/no dysuria/no hematuria/no nausea/no vomiting

## 2022-05-04 ENCOUNTER — TELEPHONE (OUTPATIENT)
Dept: FAMILY MEDICINE CLINIC | Facility: CLINIC | Age: 42
End: 2022-05-04

## 2022-05-04 NOTE — TELEPHONE ENCOUNTER
Please call.  I received a certificate of medical necessity for his pump supplies and diabetic supplies.  However, I have not seen him since 2019.  Is he still seeing endocrinology?  They may be the ones to fill this out.

## 2022-05-05 NOTE — TELEPHONE ENCOUNTER
He is not seeing a endocrinologist anymore. Message sent up front to schedule patient due to after hours.       Patient asked to be called after 3 tomorrow.

## 2022-05-31 ENCOUNTER — TELEPHONE (OUTPATIENT)
Dept: FAMILY MEDICINE CLINIC | Facility: CLINIC | Age: 42
End: 2022-05-31

## 2022-05-31 ENCOUNTER — OFFICE VISIT (OUTPATIENT)
Dept: FAMILY MEDICINE CLINIC | Facility: CLINIC | Age: 42
End: 2022-05-31

## 2022-05-31 VITALS
WEIGHT: 211 LBS | SYSTOLIC BLOOD PRESSURE: 126 MMHG | TEMPERATURE: 97.8 F | DIASTOLIC BLOOD PRESSURE: 76 MMHG | RESPIRATION RATE: 18 BRPM | HEART RATE: 94 BPM | HEIGHT: 71 IN | BODY MASS INDEX: 29.54 KG/M2 | OXYGEN SATURATION: 97 %

## 2022-05-31 DIAGNOSIS — H83.3X3 NOISE-INDUCED HEARING LOSS OF BOTH EARS: ICD-10-CM

## 2022-05-31 DIAGNOSIS — E10.65 UNCONTROLLED TYPE 1 DIABETES MELLITUS WITH HYPERGLYCEMIA: Primary | ICD-10-CM

## 2022-05-31 DIAGNOSIS — N52.9 ERECTILE DYSFUNCTION, UNSPECIFIED ERECTILE DYSFUNCTION TYPE: ICD-10-CM

## 2022-05-31 DIAGNOSIS — E78.2 MIXED HYPERLIPIDEMIA: ICD-10-CM

## 2022-05-31 DIAGNOSIS — I10 ESSENTIAL HYPERTENSION: ICD-10-CM

## 2022-05-31 PROCEDURE — 99214 OFFICE O/P EST MOD 30 MIN: CPT | Performed by: FAMILY MEDICINE

## 2022-05-31 RX ORDER — ROSUVASTATIN CALCIUM 20 MG/1
20 TABLET, COATED ORAL NIGHTLY
Qty: 90 TABLET | Refills: 1 | Status: SHIPPED | OUTPATIENT
Start: 2022-05-31 | End: 2023-01-12

## 2022-05-31 RX ORDER — LISINOPRIL AND HYDROCHLOROTHIAZIDE 25; 20 MG/1; MG/1
1 TABLET ORAL DAILY
Qty: 90 TABLET | Refills: 1 | Status: SHIPPED | OUTPATIENT
Start: 2022-05-31

## 2022-05-31 RX ORDER — SILDENAFIL 100 MG/1
100 TABLET, FILM COATED ORAL DAILY PRN
Qty: 8 TABLET | Refills: 2 | Status: SHIPPED | OUTPATIENT
Start: 2022-05-31

## 2022-06-01 LAB
ALBUMIN SERPL-MCNC: 4.5 G/DL (ref 4–5)
ALBUMIN/GLOB SERPL: 1.7 {RATIO} (ref 1.2–2.2)
ALP SERPL-CCNC: 105 IU/L (ref 44–121)
ALT SERPL-CCNC: 22 IU/L (ref 0–44)
AST SERPL-CCNC: 18 IU/L (ref 0–40)
BILIRUB SERPL-MCNC: 0.3 MG/DL (ref 0–1.2)
BUN SERPL-MCNC: 17 MG/DL (ref 6–24)
BUN/CREAT SERPL: 17 (ref 9–20)
CALCIUM SERPL-MCNC: 9.7 MG/DL (ref 8.7–10.2)
CHLORIDE SERPL-SCNC: 103 MMOL/L (ref 96–106)
CHOLEST SERPL-MCNC: 120 MG/DL (ref 100–199)
CHOLEST/HDLC SERPL: 3.4 RATIO (ref 0–5)
CO2 SERPL-SCNC: 24 MMOL/L (ref 20–29)
CREAT SERPL-MCNC: 1.02 MG/DL (ref 0.76–1.27)
EGFRCR SERPLBLD CKD-EPI 2021: 94 ML/MIN/1.73
GLOBULIN SER CALC-MCNC: 2.6 G/DL (ref 1.5–4.5)
GLUCOSE SERPL-MCNC: 136 MG/DL (ref 65–99)
HBA1C MFR BLD: 8.1 % (ref 4.8–5.6)
HDLC SERPL-MCNC: 35 MG/DL
LDLC SERPL CALC-MCNC: 56 MG/DL (ref 0–99)
POTASSIUM SERPL-SCNC: 4.5 MMOL/L (ref 3.5–5.2)
PROT SERPL-MCNC: 7.1 G/DL (ref 6–8.5)
SODIUM SERPL-SCNC: 142 MMOL/L (ref 134–144)
TRIGL SERPL-MCNC: 172 MG/DL (ref 0–149)
VLDLC SERPL CALC-MCNC: 29 MG/DL (ref 5–40)

## 2022-07-16 DIAGNOSIS — E10.65 UNCONTROLLED TYPE 1 DIABETES MELLITUS WITH HYPERGLYCEMIA: ICD-10-CM

## 2022-07-18 RX ORDER — INSULIN ASPART 100 [IU]/ML
INJECTION, SOLUTION INTRAVENOUS; SUBCUTANEOUS
Qty: 60 ML | Refills: 1 | Status: SHIPPED | OUTPATIENT
Start: 2022-07-18 | End: 2022-10-25 | Stop reason: SDUPTHER

## 2022-07-28 ENCOUNTER — TELEPHONE (OUTPATIENT)
Dept: URGENT CARE | Facility: CLINIC | Age: 42
End: 2022-07-28

## 2022-07-28 DIAGNOSIS — N39.0 URINARY TRACT INFECTION WITHOUT HEMATURIA, SITE UNSPECIFIED: Primary | ICD-10-CM

## 2022-07-28 RX ORDER — CIPROFLOXACIN 500 MG/1
TABLET, FILM COATED ORAL
Qty: 14 TABLET | Refills: 0 | OUTPATIENT
Start: 2022-07-28 | End: 2022-08-16

## 2022-08-16 PROCEDURE — U0004 COV-19 TEST NON-CDC HGH THRU: HCPCS | Performed by: PHYSICIAN ASSISTANT

## 2022-08-17 ENCOUNTER — TELEPHONE (OUTPATIENT)
Dept: FAMILY MEDICINE CLINIC | Facility: CLINIC | Age: 42
End: 2022-08-17

## 2022-08-17 DIAGNOSIS — Z11.52 ENCOUNTER FOR SCREENING FOR COVID-19: ICD-10-CM

## 2022-08-17 DIAGNOSIS — R05.9 COUGH: ICD-10-CM

## 2022-08-17 DIAGNOSIS — R06.09 OTHER FORM OF DYSPNEA: ICD-10-CM

## 2022-08-17 RX ORDER — LEVALBUTEROL TARTRATE 45 UG/1
1-2 AEROSOL, METERED ORAL EVERY 4 HOURS PRN
Qty: 15 G | Refills: 0 | Status: CANCELLED | OUTPATIENT
Start: 2022-08-17

## 2022-08-17 RX ORDER — LEVALBUTEROL INHALATION SOLUTION 1.25 MG/3ML
1 SOLUTION RESPIRATORY (INHALATION) EVERY 4 HOURS PRN
Qty: 60 EACH | Refills: 0 | Status: CANCELLED | OUTPATIENT
Start: 2022-08-17

## 2022-09-01 NOTE — TELEPHONE ENCOUNTER
The [facility] gave him an inhalers when he had covid that was not covered. This is no longer needed and he if doing better.

## 2022-09-02 ENCOUNTER — TELEPHONE (OUTPATIENT)
Dept: FAMILY MEDICINE CLINIC | Facility: CLINIC | Age: 42
End: 2022-09-02

## 2022-09-02 NOTE — TELEPHONE ENCOUNTER
Caller: SHIRLEY    Relationship: Other    Best call back number: [phone] OPTION 2    Requested Prescriptions: DIABETES INSULN SUPPLIES AND ACCESSORIES       Pharmacy where request should be sent:  [facility]    Additional details WILL BE FAXING OVER REQUEST     Dario Flowers Rep   09/02/22 15:39 EDT

## 2022-09-12 ENCOUNTER — TELEPHONE (OUTPATIENT)
Dept: FAMILY MEDICINE CLINIC | Facility: CLINIC | Age: 42
End: 2022-09-12

## 2022-09-12 NOTE — TELEPHONE ENCOUNTER
Caller: MARY    Relationship: [employer]    Best call back number: [phone]    Who are you requesting to speak with (clinical staff, provider,  specific staff member): DR EVANS    What was the call regarding: DIABETES INSULN SUPPLIES AND ACCESSORIES     FAX#: [phone]    Do you require a callback: YES

## 2022-10-25 ENCOUNTER — TELEPHONE (OUTPATIENT)
Dept: FAMILY MEDICINE CLINIC | Facility: CLINIC | Age: 42
End: 2022-10-25

## 2022-10-25 DIAGNOSIS — E10.65 UNCONTROLLED TYPE 1 DIABETES MELLITUS WITH HYPERGLYCEMIA: ICD-10-CM

## 2022-10-25 RX ORDER — INSULIN ASPART 100 [IU]/ML
INJECTION, SOLUTION INTRAVENOUS; SUBCUTANEOUS
Qty: 60 ML | Refills: 1 | Status: SHIPPED | OUTPATIENT
Start: 2022-10-25 | End: 2023-03-07

## 2022-10-25 NOTE — TELEPHONE ENCOUNTER
Please call.  He is due for follow-up office visit and blood work.  Please confirm what prescription he needs and I can send that to the pharmacy.

## 2022-10-25 NOTE — TELEPHONE ENCOUNTER
Caller: Stephanie Pires    Relationship: Emergency Contact    Best call back number: [phone]     Requested Prescriptions:   Requested Prescriptions      No prescriptions requested or ordered in this encounter        Pharmacy where request should be sent: [facility] [address] [place] [address] [phone] [facility] [phone] FX     Additional details provided by patient: INSULIN FOR PUMP WOULD NEED REFILLED; COMPLETELY OUT OF MEDICATION; PATIENT WOULD LIKE TO KNOW IF PROVIDER WOULD WANT HIM TO COME INTO GET THOSE DONE OR IF HE WOULD NEED TO SEE PROVIDER     PLEASE ADVISE      Does the patient have less than a 3 day supply:  [x] Yes  [] No    Dario Conn Rep   10/25/22 12:13 EDT

## 2022-12-30 ENCOUNTER — OFFICE VISIT (OUTPATIENT)
Dept: FAMILY MEDICINE CLINIC | Facility: CLINIC | Age: 42
End: 2022-12-30

## 2022-12-30 VITALS
TEMPERATURE: 98.4 F | HEART RATE: 83 BPM | HEIGHT: 70 IN | SYSTOLIC BLOOD PRESSURE: 132 MMHG | WEIGHT: 209 LBS | RESPIRATION RATE: 18 BRPM | DIASTOLIC BLOOD PRESSURE: 84 MMHG | OXYGEN SATURATION: 97 % | BODY MASS INDEX: 29.92 KG/M2

## 2022-12-30 DIAGNOSIS — I10 ESSENTIAL HYPERTENSION: ICD-10-CM

## 2022-12-30 DIAGNOSIS — E10.65 UNCONTROLLED TYPE 1 DIABETES MELLITUS WITH HYPERGLYCEMIA: Primary | ICD-10-CM

## 2022-12-30 DIAGNOSIS — E78.2 MIXED HYPERLIPIDEMIA: ICD-10-CM

## 2022-12-30 PROCEDURE — 99214 OFFICE O/P EST MOD 30 MIN: CPT | Performed by: FAMILY MEDICINE

## 2022-12-30 NOTE — PROGRESS NOTES
"Chief Complaint  Med Refill (Diabetes f/u)    Subjective          Robert Barron presents to [facility]  History of Present Illness    The patient presents today accompanied by his wife.    Type 1 diabetes mellitus  The patient reports he is not working right now, and his blood glucose levels have been \" jacked up \". He states he is usually doing well during work; however, his evening time levels are starting to elevate. He notes his carbohydrate ratio is 11. He admits his basal rate is 1.75 between 12 AM. He admits his blood glucose levels elevate a couple of hours after he gets home, which is between 5 or 6 PM. He denies having low blood glucose levels often. He reports he had a low blood glucose level a couple of weeks ago; however, it has been a while. He states his diet has been poor due to the holidays. He denies any chest pain, trouble breathing, or headaches. He notes he has nerve pain in his feet. He adds he checks his feet for sores.    Health maintenance  He reports he does not normally get the influenza vaccination. He states he has received the first 2 doses of the COVID-19 vaccine, and does not want the boosters. His wife has been stressed a lot lately. The patient notes his nephew passed away and him and his sister are not seeing \"eye to eye.\"      11 carb ratio  12 am to 4 pm 1.75  4 to 12 am 2.00    Review of Systems   Constitutional: Negative.  Negative for fatigue and unexpected weight loss.   HENT: Negative.    Eyes: Negative for blurred vision.   Respiratory: Negative.    Cardiovascular: Negative.  Negative for chest pain.   Gastrointestinal: Negative.    Endocrine: Negative for polydipsia, polyphagia and polyuria.   Skin: Negative for pallor.   Neurological: Negative for dizziness, tremors, seizures, speech difficulty, weakness and confusion.   Psychiatric/Behavioral: The patient is not nervous/anxious.         Objective       Vital Signs:   /84   " "Pulse 83   Temp 98.4 °F (36.9 °C)   Resp 18   Ht 177.8 cm (70\")   Wt 94.8 kg (209 lb)   SpO2 97%   BMI 29.99 kg/m²     Physical Exam  Vitals and nursing note reviewed.   Constitutional:       General: He is not in acute distress.     Appearance: Normal appearance. He is well-developed. He is not ill-appearing.   HENT:      Head: Normocephalic and atraumatic.      Right Ear: Hearing, tympanic membrane, ear canal and external ear normal.      Left Ear: Hearing, tympanic membrane, ear canal and external ear normal.      Nose: Nose normal. No congestion or rhinorrhea.      Mouth/Throat:      Mouth: Mucous membranes are moist.      Pharynx: No oropharyngeal exudate or posterior oropharyngeal erythema.   Eyes:      General: Lids are normal.      Conjunctiva/sclera: Conjunctivae normal.      Pupils: Pupils are equal, round, and reactive to light.   Neck:      Thyroid: No thyromegaly.   Cardiovascular:      Rate and Rhythm: Normal rate and regular rhythm.      Heart sounds: Normal heart sounds. No murmur heard.    No friction rub.   Pulmonary:      Effort: Pulmonary effort is normal. No respiratory distress.      Breath sounds: Normal breath sounds. No wheezing or rales.   Abdominal:      General: Bowel sounds are normal. There is no distension.      Palpations: Abdomen is soft. There is no mass.      Tenderness: There is no abdominal tenderness. There is no guarding or rebound.   Musculoskeletal:      Right lower leg: No edema.      Left lower leg: No edema.   Skin:     General: Skin is warm and dry.   Neurological:      General: No focal deficit present.      Mental Status: He is alert.   Psychiatric:         Mood and Affect: Mood normal.         Speech: Speech normal.         Behavior: Behavior normal.          Result Review :                     Assessment and Plan    Diagnoses and all orders for this visit:    1. Uncontrolled type 1 diabetes mellitus with hyperglycemia (HCC) (Primary)  Comments:  The patient will " continue on his current medication regimen.   The patient will continue with diet and exercise.     Orders:  -     Hemoglobin A1c  -     Lipid Panel With / Chol / HDL Ratio  -     Comprehensive Metabolic Panel    2. Essential hypertension  Comments:  The patient will continue on his current medication regimen.   Orders:  -     Comprehensive Metabolic Panel    3. Mixed hyperlipidemia  Comments:  The patient will continue on his current medication regimen.   Orders:  -     Lipid Panel With / Chol / HDL Ratio          DISCUSSION    Diabetes mellitus type 1.  Continue insulin pump.  Check labs as noted.  Further plan once labs are back.    Hypertension.  Check CMP.    Hyperlipidemia.  Recheck CMP and lipid panel.        Follow Up   Return for follow up depends on review of labs and testing.    Patient was given instructions and counseling regarding his condition or for health maintenance advice. Please see specific information pulled into the AVS if appropriate.     Carlitos Whitfield MD     Transcribed from ambient dictation for Carlitos Whitfield MD by Nelida Dempsey.  12/30/22   16:53 EST    Patient or patient representative verbalized consent to the visit recording.  I have personally performed the services described in this document as transcribed by the above individual, and it is both accurate and complete.  Carlitos Whitfield MD  12/30/2022  18:47 EST

## 2022-12-31 LAB
ALBUMIN SERPL-MCNC: 4.4 G/DL (ref 4–5)
ALBUMIN/GLOB SERPL: 1.8 {RATIO} (ref 1.2–2.2)
ALP SERPL-CCNC: 96 IU/L (ref 44–121)
ALT SERPL-CCNC: 18 IU/L (ref 0–44)
AST SERPL-CCNC: 17 IU/L (ref 0–40)
BILIRUB SERPL-MCNC: 0.6 MG/DL (ref 0–1.2)
BUN SERPL-MCNC: 14 MG/DL (ref 6–24)
BUN/CREAT SERPL: 16 (ref 9–20)
CALCIUM SERPL-MCNC: 9.3 MG/DL (ref 8.7–10.2)
CHLORIDE SERPL-SCNC: 102 MMOL/L (ref 96–106)
CHOLEST SERPL-MCNC: 138 MG/DL (ref 100–199)
CHOLEST/HDLC SERPL: 4.1 RATIO (ref 0–5)
CO2 SERPL-SCNC: 26 MMOL/L (ref 20–29)
CREAT SERPL-MCNC: 0.9 MG/DL (ref 0.76–1.27)
EGFRCR SERPLBLD CKD-EPI 2021: 109 ML/MIN/1.73
GLOBULIN SER CALC-MCNC: 2.4 G/DL (ref 1.5–4.5)
GLUCOSE SERPL-MCNC: 197 MG/DL (ref 70–99)
HBA1C MFR BLD: 9 % (ref 4.8–5.6)
HDLC SERPL-MCNC: 34 MG/DL
LDLC SERPL CALC-MCNC: 87 MG/DL (ref 0–99)
POTASSIUM SERPL-SCNC: 5 MMOL/L (ref 3.5–5.2)
PROT SERPL-MCNC: 6.8 G/DL (ref 6–8.5)
SODIUM SERPL-SCNC: 139 MMOL/L (ref 134–144)
TRIGL SERPL-MCNC: 87 MG/DL (ref 0–149)
VLDLC SERPL CALC-MCNC: 17 MG/DL (ref 5–40)

## 2023-01-11 DIAGNOSIS — E78.2 MIXED HYPERLIPIDEMIA: ICD-10-CM

## 2023-01-12 RX ORDER — ROSUVASTATIN CALCIUM 20 MG/1
TABLET, COATED ORAL
Qty: 90 TABLET | Refills: 1 | Status: SHIPPED | OUTPATIENT
Start: 2023-01-12

## 2023-03-06 ENCOUNTER — TELEPHONE (OUTPATIENT)
Dept: FAMILY MEDICINE CLINIC | Facility: CLINIC | Age: 43
End: 2023-03-06

## 2023-03-06 DIAGNOSIS — E10.65 UNCONTROLLED TYPE 1 DIABETES MELLITUS WITH HYPERGLYCEMIA: ICD-10-CM

## 2023-03-07 RX ORDER — INSULIN ASPART 100 [IU]/ML
INJECTION, SOLUTION INTRAVENOUS; SUBCUTANEOUS
Qty: 60 ML | Refills: 1 | Status: SHIPPED | OUTPATIENT
Start: 2023-03-07 | End: 2023-07-10

## 2023-03-08 ENCOUNTER — TELEPHONE (OUTPATIENT)
Dept: FAMILY MEDICINE CLINIC | Facility: CLINIC | Age: 43
End: 2023-03-08
Payer: COMMERCIAL

## 2023-03-08 NOTE — TELEPHONE ENCOUNTER
Regarding: Prior authorization  Contact: [phone]  ----- Message from Tiffany Ba sent at 3/8/2023  4:16 PM EST -----  PA is pending via [employer].     ----- Message from Robert Barron to Carlitos Whitfield MD sent at 3/8/2023  3:02 PM -----   I need a Prior authorization on my novalog. My insurance is wanting to put me on Humalog and the last time I tried that I had diabetic keto doses and stayed in ICU for a week. I would like to avoid that.

## 2023-03-10 ENCOUNTER — TELEPHONE (OUTPATIENT)
Dept: FAMILY MEDICINE CLINIC | Facility: CLINIC | Age: 43
End: 2023-03-10
Payer: COMMERCIAL

## 2023-03-10 NOTE — TELEPHONE ENCOUNTER
Called insurance. PA is Approved Ref# [id number] Paid claim from 2/6/2023 to 3/9/2024   Called informed pt wife

## 2023-03-10 NOTE — TELEPHONE ENCOUNTER
Please check on the prior authorization for this.  It needs to be done today urgently or he will run out of insulin and go into diabetic ketoacidosis.

## 2023-03-10 NOTE — TELEPHONE ENCOUNTER
Regarding: RE: Prior authorization  Contact: [phone]  Can you put an expide on that PA because they said they need more information and it is Friday and I barely have insulin     ----- Message -----  From: Carlitos Whitfield  Sent: 3/8/23 5:02 PM  To: Robert Barron  Subject: Prior authorization    The prior authorization is in process.        ----- Message -----       From:Robert Barron       Sent:3/8/2023  3:02 PM EST         To:Carlitos Whitfield    Subject:Prior authorization    I need a Prior authorization on my novalog. My insurance is wanting to put me on Humalog and the last time I tried that I had diabetic keto doses and stayed in ICU for a week. I would like to avoid that.

## 2023-07-31 DIAGNOSIS — E10.65 UNCONTROLLED TYPE 1 DIABETES MELLITUS WITH HYPERGLYCEMIA: ICD-10-CM

## 2023-07-31 RX ORDER — INSULIN ASPART 100 [IU]/ML
INJECTION, SOLUTION INTRAVENOUS; SUBCUTANEOUS
Qty: 60 ML | Refills: 0 | Status: SHIPPED | OUTPATIENT
Start: 2023-07-31

## 2023-08-15 ENCOUNTER — OFFICE VISIT (OUTPATIENT)
Dept: FAMILY MEDICINE CLINIC | Facility: CLINIC | Age: 43
End: 2023-08-15
Payer: COMMERCIAL

## 2023-08-15 VITALS
WEIGHT: 212 LBS | HEART RATE: 76 BPM | BODY MASS INDEX: 30.35 KG/M2 | TEMPERATURE: 97.1 F | SYSTOLIC BLOOD PRESSURE: 134 MMHG | RESPIRATION RATE: 18 BRPM | HEIGHT: 70 IN | DIASTOLIC BLOOD PRESSURE: 86 MMHG

## 2023-08-15 DIAGNOSIS — I10 ESSENTIAL HYPERTENSION: ICD-10-CM

## 2023-08-15 DIAGNOSIS — E10.65 UNCONTROLLED TYPE 1 DIABETES MELLITUS WITH HYPERGLYCEMIA: Primary | ICD-10-CM

## 2023-08-15 DIAGNOSIS — E78.2 MIXED HYPERLIPIDEMIA: ICD-10-CM

## 2023-08-15 PROCEDURE — 99214 OFFICE O/P EST MOD 30 MIN: CPT | Performed by: FAMILY MEDICINE

## 2023-08-15 RX ORDER — ROSUVASTATIN CALCIUM 20 MG/1
20 TABLET, COATED ORAL NIGHTLY
Qty: 90 TABLET | Refills: 1 | Status: SHIPPED | OUTPATIENT
Start: 2023-08-15

## 2023-08-15 NOTE — PROGRESS NOTES
Chief Complaint  Diabetes (F/u)    Subjective          Robert Barron presents to [facility]  History of Present Illness  The patient is a 43-year-old male who presents today for a follow-up on diabetes.    Diabetes mellitus type 1  The patient believes his A1c is elevated. He has been checking his blood glucose at home. He received his [employer] sensor; however, it  yesterday, 2023, and he got it put back on today, 08/15/2023. He is still taking NovoLog. His carb ratio is 11 and basal is 44 units. He takes 2 units an hour for his basal. He has not been eating healthy since his mother-in-law passed away. When he was checking his blood glucose, it was doing well. His blood glucose would usually stay high around 150. His last A1c was 9.0. He is still taking metformin. He denies any numbness or tingling in his feet.    Hypertension  The patient's blood pressure today is 134/86 mmHg. He is still taking lisinopril-hydrochlorothiazide.    Hyperlipidemia  The patient is taking rosuvastatin for his cholesterol. He denies any muscle aches or pains. He denies any cold or cough.    Abdominal issues  The patient gets more gassy. He denies any diarrhea or blood in his stool.    Health maintenance  The patient's last eye exam was 3 to 4 months ago. He just got glasses. He did have a spot in one of his eyes, but he was told it is gone. He is using hearing aids.    Insulin pump   - basal rate 11  carb ratio  2 units per hour             Review of Systems   Constitutional: Negative.    HENT: Negative.  Negative for congestion.    Respiratory:  Negative for cough and shortness of breath.    Cardiovascular: Negative.  Negative for chest pain.   Gastrointestinal:  Negative for blood in stool, diarrhea and nausea.        May be rumble and needs to have BM.    Neurological:  Negative for numbness (no feet symptoms).   Psychiatric/Behavioral: Negative.  Positive for stress.    "    Objective       Vital Signs:   /86   Pulse 76   Temp 97.1 øF (36.2 øC)   Resp 18   Ht 177.8 cm (70\")   Wt 96.2 kg (212 lb)   BMI 30.42 kg/mý     Physical Exam  Vitals and nursing note reviewed.   Constitutional:       General: He is not in acute distress.     Appearance: Normal appearance. He is well-developed. He is not ill-appearing.   HENT:      Head: Normocephalic and atraumatic.      Right Ear: Tympanic membrane, ear canal and external ear normal. Decreased hearing noted.      Left Ear: Tympanic membrane, ear canal and external ear normal. Decreased hearing noted.      Ears:      Comments: Has bilateral hearing aids     Nose: Nose normal. No congestion or rhinorrhea.      Mouth/Throat:      Mouth: Mucous membranes are moist.      Pharynx: No oropharyngeal exudate or posterior oropharyngeal erythema.   Eyes:      General: Lids are normal.      Conjunctiva/sclera: Conjunctivae normal.      Pupils: Pupils are equal, round, and reactive to light.   Neck:      Thyroid: No thyromegaly.   Cardiovascular:      Rate and Rhythm: Normal rate and regular rhythm.      Heart sounds: Normal heart sounds. No murmur heard.    No friction rub.   Pulmonary:      Effort: Pulmonary effort is normal. No respiratory distress.      Breath sounds: Normal breath sounds. No wheezing or rales.   Abdominal:      General: Bowel sounds are normal. There is no distension.      Palpations: Abdomen is soft. There is no mass.      Tenderness: There is no abdominal tenderness. There is no guarding or rebound.   Musculoskeletal:      Right lower leg: No edema.      Left lower leg: No edema.   Skin:     General: Skin is warm and dry.   Neurological:      General: No focal deficit present.      Mental Status: He is alert.   Psychiatric:         Mood and Affect: Mood normal.         Speech: Speech normal.         Behavior: Behavior normal.        Result Review :          Last A1c was 9.0.           Assessment and Plan    Diagnoses and " all orders for this visit:    1. Uncontrolled type 1 diabetes mellitus with hyperglycemia (Primary)  -     Lipid Panel With / Chol / HDL Ratio  -     Hemoglobin A1c  -     Comprehensive Metabolic Panel    2. Essential hypertension    3. Mixed hyperlipidemia  -     Lipid Panel With / Chol / HDL Ratio  -     Comprehensive Metabolic Panel  -     rosuvastatin (CRESTOR) 20 MG tablet; Take 1 tablet by mouth Every Night.  Dispense: 90 tablet; Refill: 1          DISCUSSION    1. Diabetes mellitus type 1, uncontrolled.  Currently on insulin pump. We will check A1c, CMP, and lipid panel. Further plan once labs are back.    2. Hypertension.  Blood pressure is also stable. Continue lisinopril and hydrochlorothiazide. Check CMP.    3. Hyperlipidemia.  We will check CMP and lipid panel. Refilled rosuvastatin 20 mg daily.        Follow Up   Return for follow up depends on review of labs and testing.    Patient was given instructions and counseling regarding his condition or for health maintenance advice. Please see specific information pulled into the AVS if appropriate.       Carlitos Whitfield MD    Transcribed from ambient dictation for Carlitos Whitfield MD by Teresa Eason.  08/15/23   16:18 EDT    Patient or patient representative verbalized consent to the visit recording.  I have personally performed the services described in this document as transcribed by the above individual, and it is both accurate and complete.  Carlitos Whitfield MD  8/15/2023  18:31 EDT

## 2023-08-16 LAB
ALBUMIN SERPL-MCNC: 4.6 G/DL (ref 4.1–5.1)
ALBUMIN/GLOB SERPL: 2.1 {RATIO} (ref 1.2–2.2)
ALP SERPL-CCNC: 106 IU/L (ref 44–121)
ALT SERPL-CCNC: 17 IU/L (ref 0–44)
AST SERPL-CCNC: 14 IU/L (ref 0–40)
BILIRUB SERPL-MCNC: 0.4 MG/DL (ref 0–1.2)
BUN SERPL-MCNC: 14 MG/DL (ref 6–24)
BUN/CREAT SERPL: 14 (ref 9–20)
CALCIUM SERPL-MCNC: 9.6 MG/DL (ref 8.7–10.2)
CHLORIDE SERPL-SCNC: 98 MMOL/L (ref 96–106)
CHOLEST SERPL-MCNC: 193 MG/DL (ref 100–199)
CHOLEST/HDLC SERPL: 4.7 RATIO (ref 0–5)
CO2 SERPL-SCNC: 24 MMOL/L (ref 20–29)
CREAT SERPL-MCNC: 0.97 MG/DL (ref 0.76–1.27)
EGFRCR SERPLBLD CKD-EPI 2021: 99 ML/MIN/1.73
GLOBULIN SER CALC-MCNC: 2.2 G/DL (ref 1.5–4.5)
GLUCOSE SERPL-MCNC: 251 MG/DL (ref 70–99)
HBA1C MFR BLD: 8.5 % (ref 4.8–5.6)
HDLC SERPL-MCNC: 41 MG/DL
LDLC SERPL CALC-MCNC: 128 MG/DL (ref 0–99)
POTASSIUM SERPL-SCNC: 4.9 MMOL/L (ref 3.5–5.2)
PROT SERPL-MCNC: 6.8 G/DL (ref 6–8.5)
SODIUM SERPL-SCNC: 137 MMOL/L (ref 134–144)
TRIGL SERPL-MCNC: 132 MG/DL (ref 0–149)
VLDLC SERPL CALC-MCNC: 24 MG/DL (ref 5–40)

## 2023-10-05 DIAGNOSIS — E10.65 UNCONTROLLED TYPE 1 DIABETES MELLITUS WITH HYPERGLYCEMIA: ICD-10-CM

## 2023-10-05 RX ORDER — INSULIN ASPART 100 [IU]/ML
INJECTION, SOLUTION INTRAVENOUS; SUBCUTANEOUS
Qty: 60 ML | Refills: 2 | Status: SHIPPED | OUTPATIENT
Start: 2023-10-05

## 2023-12-05 ENCOUNTER — TELEPHONE (OUTPATIENT)
Dept: FAMILY MEDICINE CLINIC | Facility: CLINIC | Age: 43
End: 2023-12-05
Payer: COMMERCIAL

## 2023-12-05 DIAGNOSIS — E10.65 UNCONTROLLED TYPE 1 DIABETES MELLITUS WITH HYPERGLYCEMIA: ICD-10-CM

## 2023-12-05 RX ORDER — INSULIN ASPART 100 [IU]/ML
INJECTION, SOLUTION INTRAVENOUS; SUBCUTANEOUS
Qty: 60 ML | Refills: 2 | Status: SHIPPED | OUTPATIENT
Start: 2023-12-05

## 2023-12-05 NOTE — TELEPHONE ENCOUNTER
[facility] (supporting You)Yesterday (1:57 PM)       I started to order Robert diabetic supplies today's and they said their prescription has . He only has one box of virals left. They told me they would fax it to you I'm sorry I feel like everything he has, has  right here at the end of the year.

## 2023-12-07 ENCOUNTER — TELEPHONE (OUTPATIENT)
Dept: FAMILY MEDICINE CLINIC | Facility: CLINIC | Age: 43
End: 2023-12-07
Payer: COMMERCIAL

## 2023-12-07 NOTE — TELEPHONE ENCOUNTER
Caller: [employer]    Relationship: Other    Best call back number: [phone] OPTION 2    Equipment requested: DIABETES SUPPLY    Reason for the request:      Prescribing Provider: ESAU EVANS MD    Additional information or concerns: FAX NUMBER [phone]. [employer] ADVISED THEY WOULD FAX EVERYTHING TO US, PLEASE BE LOOKING FOR THE FAX.

## 2023-12-29 ENCOUNTER — OFFICE VISIT (OUTPATIENT)
Dept: FAMILY MEDICINE CLINIC | Facility: CLINIC | Age: 43
End: 2023-12-29
Payer: COMMERCIAL

## 2023-12-29 VITALS
RESPIRATION RATE: 17 BRPM | HEART RATE: 79 BPM | WEIGHT: 209.2 LBS | SYSTOLIC BLOOD PRESSURE: 150 MMHG | BODY MASS INDEX: 29.95 KG/M2 | TEMPERATURE: 97.5 F | HEIGHT: 70 IN | DIASTOLIC BLOOD PRESSURE: 92 MMHG | OXYGEN SATURATION: 98 %

## 2023-12-29 DIAGNOSIS — E78.2 MIXED HYPERLIPIDEMIA: ICD-10-CM

## 2023-12-29 DIAGNOSIS — E10.65 UNCONTROLLED TYPE 1 DIABETES MELLITUS WITH HYPERGLYCEMIA: Primary | ICD-10-CM

## 2023-12-29 DIAGNOSIS — I10 ESSENTIAL HYPERTENSION: ICD-10-CM

## 2023-12-29 LAB
EXPIRATION DATE: NORMAL
Lab: NORMAL
POC CREATININE URINE: NORMAL
POC MICROALBUMIN URINE: NORMAL

## 2023-12-29 PROCEDURE — 99214 OFFICE O/P EST MOD 30 MIN: CPT | Performed by: FAMILY MEDICINE

## 2023-12-29 PROCEDURE — 82044 UR ALBUMIN SEMIQUANTITATIVE: CPT | Performed by: FAMILY MEDICINE

## 2023-12-29 NOTE — PROGRESS NOTES
Chief Complaint  Follow-up (Follow up)    Subjective          Robert Barron presents to [facility]    Robert Barron is a 43-year-old male who presents today for a follow-up of diabetes. He is accompanied by his wife.    Diabetes  His blood glucose levels are doing relatively good. He conveys that his blood glucose levels are around 150 mg/dL at this time of the year. His basal rate is the same as it was before, 2 units per hour, 11 carb ratio. He is not taking metformin every day. He feels like his blood glucose levels are dropping. His blood glucose levels may drop down to 40 mg/dLto 50 mg/dL. He conveys that eating something will bring the level back to normal. He denies any angina, dyspnea, vertigo, or syncope. He denies any stomach problems or trouble urinating. His mood has been okay. He is happy.    Hypertension  He is not taking his blood pressure medication every day. He needs a better system of taking medicine. He has a way to check his blood pressure at home. Blood pressure recheck was 150/92 mmHg.    Hyperlipidemia  He is not taking his cholesterol medication. He is terrible at taking it.    Foot pain  He is having trouble with his feet. He conveys that his feet work shoes go numb when he wears his work shoes. He conveys that it is a stinging pain. He conveys that it feels like he has arch support in his shoe. It is better on the weekend when he is off work. He does not feel anything.    Health maintenance  He has not received his flu vaccine this year. He has not received his COVID-19 booster. He has received the first 2 doses of the COVID-19 vaccine. He had an appointment with his audiologist before his appointment here.      History of Present Illness      Review of Systems   Respiratory: Negative.  Negative for shortness of breath.    Cardiovascular: Negative.  Negative for chest pain.   Gastrointestinal: Negative.    Genitourinary: Negative.    Neurological:  " Positive for headache (once per patient). Negative for dizziness and syncope.   Psychiatric/Behavioral: Negative.          Objective       Vital Signs:   /92   Pulse 79   Temp 97.5 °F (36.4 °C)   Resp 17   Ht 177.8 cm (70\")   Wt 94.9 kg (209 lb 3.2 oz)   SpO2 98%   BMI 30.02 kg/m²     Physical Exam  Vitals and nursing note reviewed.   Constitutional:       General: He is not in acute distress.     Appearance: Normal appearance. He is well-developed. He is not ill-appearing.   HENT:      Head: Normocephalic and atraumatic.      Comments: Hearing aids in place     Right Ear: Hearing, tympanic membrane, ear canal and external ear normal.      Left Ear: Hearing, tympanic membrane, ear canal and external ear normal.      Nose: Nose normal. No congestion or rhinorrhea.      Mouth/Throat:      Mouth: Mucous membranes are moist.      Pharynx: No oropharyngeal exudate or posterior oropharyngeal erythema.   Eyes:      General: Lids are normal.      Conjunctiva/sclera: Conjunctivae normal.      Pupils: Pupils are equal, round, and reactive to light.   Neck:      Thyroid: No thyromegaly.   Cardiovascular:      Rate and Rhythm: Normal rate and regular rhythm.      Pulses:           Dorsalis pedis pulses are 1+ on the right side and 1+ on the left side.      Heart sounds: Normal heart sounds. No murmur heard.     No friction rub.   Pulmonary:      Effort: Pulmonary effort is normal. No respiratory distress.      Breath sounds: Normal breath sounds. No wheezing or rales.   Abdominal:      General: Bowel sounds are normal. There is no distension.      Palpations: Abdomen is soft. There is no mass.      Tenderness: There is no abdominal tenderness. There is no guarding or rebound.   Musculoskeletal:      Right lower leg: No edema.      Left lower leg: No edema.      Right foot: Normal range of motion. No deformity.      Left foot: Normal range of motion. No deformity.   Feet:      Right foot:      Protective Sensation: " 5 sites tested.  5 sites sensed.      Skin integrity: No ulcer, skin breakdown, callus or dry skin.      Left foot:      Protective Sensation: 5 sites tested.  5 sites sensed.      Skin integrity: No ulcer, skin breakdown, callus or dry skin.      Comments: Diabetic Foot Exam Performed and Monofilament Test Performed   Skin:     General: Skin is warm and dry.   Neurological:      General: No focal deficit present.      Mental Status: He is alert.   Psychiatric:         Mood and Affect: Mood normal.         Speech: Speech normal.         Behavior: Behavior normal.          Result Review :                     Assessment and Plan    Diagnoses and all orders for this visit:    1. Uncontrolled type 1 diabetes mellitus with hyperglycemia (Primary)  -     Comprehensive Metabolic Panel  -     Hemoglobin A1c  -     POC Microalbumin    2. Essential hypertension  -     Comprehensive Metabolic Panel  -     CBC & Differential    3. Mixed hyperlipidemia  -     Comprehensive Metabolic Panel  -     Lipid Panel With / Chol / HDL Ratio          DISCUSSION    1. Diabetes.  - Last hemoglobin A1c from 08/16/2023, was 8.5 percent.  - He is on an insulin pump.  - He has not been taking his blood pressure medication, cholesterol medication, or metformin on a regular basis. Encouraged him to start doing that. He expressed understanding.  - He did complain of some foot pain. Neurologic examination today is intact with no evidence of neuropathy.  - We will check CMP, A1c, and lipid panel today.  - Also, we will check CBC and microalbumin.  - Further plan once labs are back.    He is in agreement with the plan.    Microalbumin was normal      Follow Up   Return in about 3 months (around 3/29/2024).    Patient was given instructions and counseling regarding his condition or for health maintenance advice. Please see specific information pulled into the AVS if appropriate.       Carlitos Whitfield MD      Transcribed from ambient dictation for Carlitos  GUSTAVO Whitfield MD by John Sylvester.  12/29/23   11:49 EST    Patient or patient representative verbalized consent to the visit recording.  I have personally performed the services described in this document as transcribed by the above individual, and it is both accurate and complete.  Carlitos Whitfield MD  12/29/2023  14:12 EST

## 2023-12-30 LAB
ALBUMIN SERPL-MCNC: 4.6 G/DL (ref 4.1–5.1)
ALBUMIN/GLOB SERPL: 1.7 {RATIO} (ref 1.2–2.2)
ALP SERPL-CCNC: 108 IU/L (ref 44–121)
ALT SERPL-CCNC: 16 IU/L (ref 0–44)
AST SERPL-CCNC: 13 IU/L (ref 0–40)
BASOPHILS # BLD AUTO: 0.1 X10E3/UL (ref 0–0.2)
BASOPHILS NFR BLD AUTO: 1 %
BILIRUB SERPL-MCNC: 0.5 MG/DL (ref 0–1.2)
BUN SERPL-MCNC: 16 MG/DL (ref 6–24)
BUN/CREAT SERPL: 17 (ref 9–20)
CALCIUM SERPL-MCNC: 9.7 MG/DL (ref 8.7–10.2)
CHLORIDE SERPL-SCNC: 99 MMOL/L (ref 96–106)
CHOLEST SERPL-MCNC: 200 MG/DL (ref 100–199)
CHOLEST/HDLC SERPL: 4.7 RATIO (ref 0–5)
CO2 SERPL-SCNC: 24 MMOL/L (ref 20–29)
CREAT SERPL-MCNC: 0.93 MG/DL (ref 0.76–1.27)
EGFRCR SERPLBLD CKD-EPI 2021: 104 ML/MIN/1.73
EOSINOPHIL # BLD AUTO: 0.2 X10E3/UL (ref 0–0.4)
EOSINOPHIL NFR BLD AUTO: 2 %
ERYTHROCYTE [DISTWIDTH] IN BLOOD BY AUTOMATED COUNT: 11.6 % (ref 11.6–15.4)
GLOBULIN SER CALC-MCNC: 2.7 G/DL (ref 1.5–4.5)
GLUCOSE SERPL-MCNC: 308 MG/DL (ref 70–99)
HBA1C MFR BLD: 8.5 % (ref 4.8–5.6)
HCT VFR BLD AUTO: 48.8 % (ref 37.5–51)
HDLC SERPL-MCNC: 43 MG/DL
HGB BLD-MCNC: 16.8 G/DL (ref 13–17.7)
IMM GRANULOCYTES # BLD AUTO: 0 X10E3/UL (ref 0–0.1)
IMM GRANULOCYTES NFR BLD AUTO: 0 %
LDLC SERPL CALC-MCNC: 143 MG/DL (ref 0–99)
LYMPHOCYTES # BLD AUTO: 1.4 X10E3/UL (ref 0.7–3.1)
LYMPHOCYTES NFR BLD AUTO: 15 %
MCH RBC QN AUTO: 30.7 PG (ref 26.6–33)
MCHC RBC AUTO-ENTMCNC: 34.4 G/DL (ref 31.5–35.7)
MCV RBC AUTO: 89 FL (ref 79–97)
MONOCYTES # BLD AUTO: 0.8 X10E3/UL (ref 0.1–0.9)
MONOCYTES NFR BLD AUTO: 9 %
NEUTROPHILS # BLD AUTO: 6.8 X10E3/UL (ref 1.4–7)
NEUTROPHILS NFR BLD AUTO: 73 %
PLATELET # BLD AUTO: 381 X10E3/UL (ref 150–450)
POTASSIUM SERPL-SCNC: 5.5 MMOL/L (ref 3.5–5.2)
PROT SERPL-MCNC: 7.3 G/DL (ref 6–8.5)
RBC # BLD AUTO: 5.47 X10E6/UL (ref 4.14–5.8)
SODIUM SERPL-SCNC: 136 MMOL/L (ref 134–144)
TRIGL SERPL-MCNC: 76 MG/DL (ref 0–149)
VLDLC SERPL CALC-MCNC: 14 MG/DL (ref 5–40)
WBC # BLD AUTO: 9.3 X10E3/UL (ref 3.4–10.8)

## 2024-01-17 DIAGNOSIS — I10 ESSENTIAL HYPERTENSION: ICD-10-CM

## 2024-01-17 DIAGNOSIS — E10.65 UNCONTROLLED TYPE 1 DIABETES MELLITUS WITH HYPERGLYCEMIA: ICD-10-CM

## 2024-01-17 RX ORDER — LISINOPRIL AND HYDROCHLOROTHIAZIDE 25; 20 MG/1; MG/1
1 TABLET ORAL DAILY
Qty: 90 TABLET | Refills: 1 | Status: SHIPPED | OUTPATIENT
Start: 2024-01-17

## 2024-01-17 NOTE — TELEPHONE ENCOUNTER
Caller: SHIV LEON    Relationship: Spouse    Best call back number: [phone]     Requested Prescriptions:   Requested Prescriptions     Pending Prescriptions Disp Refills    lisinopril-hydrochlorothiazide (PRINZIDE,ZESTORETIC) 20-25 MG per tablet 90 tablet 1     Sig: Take 1 tablet by mouth Daily.    metFORMIN (Glucophage) 1000 MG tablet 180 tablet 1     Sig: Take 1 tablet by mouth 2 (Two) Times a Day With Meals.        Pharmacy where request should be sent: [address] [phone] [facility] [phone]      Last office visit with prescribing clinician: 12/29/2023   Last telemedicine visit with prescribing clinician: Visit date not found   Next office visit with prescribing clinician: 3/28/2024     Additional details provided by patient: PATIENT IS OUT.     Does the patient have less than a 3 day supply:  [x] Yes  [] No    Would you like a call back once the refill request has been completed: [] Yes [x] No    If the office needs to give you a call back, can they leave a voicemail: [] Yes [x] No    Cadance Dunaway, RegSched Rep   01/17/24 11:14 EST

## 2024-04-01 DIAGNOSIS — E10.65 UNCONTROLLED TYPE 1 DIABETES MELLITUS WITH HYPERGLYCEMIA: ICD-10-CM

## 2024-04-01 RX ORDER — INSULIN ASPART 100 [IU]/ML
INJECTION, SOLUTION INTRAVENOUS; SUBCUTANEOUS
Qty: 60 ML | Refills: 2 | Status: SHIPPED | OUTPATIENT
Start: 2024-04-01

## 2024-04-03 ENCOUNTER — TELEPHONE (OUTPATIENT)
Dept: FAMILY MEDICINE CLINIC | Facility: CLINIC | Age: 44
End: 2024-04-03
Payer: COMMERCIAL

## 2024-04-03 DIAGNOSIS — E78.2 MIXED HYPERLIPIDEMIA: ICD-10-CM

## 2024-04-03 RX ORDER — ROSUVASTATIN CALCIUM 20 MG/1
20 TABLET, COATED ORAL NIGHTLY
Qty: 30 TABLET | Refills: 0 | Status: SHIPPED | OUTPATIENT
Start: 2024-04-03

## 2024-04-03 NOTE — TELEPHONE ENCOUNTER
----- Message from Robert Barron sent at 4/3/2024 12:03 PM EDT -----  Regarding: Prescription Question  Contact: [phone]  I am out of Rosuvastatin and I was seeing if you can call me some in thank you

## 2024-04-05 ENCOUNTER — OFFICE VISIT (OUTPATIENT)
Dept: FAMILY MEDICINE CLINIC | Facility: CLINIC | Age: 44
End: 2024-04-05
Payer: COMMERCIAL

## 2024-04-05 VITALS
HEART RATE: 87 BPM | SYSTOLIC BLOOD PRESSURE: 115 MMHG | DIASTOLIC BLOOD PRESSURE: 66 MMHG | TEMPERATURE: 98 F | OXYGEN SATURATION: 97 % | BODY MASS INDEX: 29.78 KG/M2 | HEIGHT: 70 IN | WEIGHT: 208 LBS

## 2024-04-05 DIAGNOSIS — I10 ESSENTIAL HYPERTENSION: ICD-10-CM

## 2024-04-05 DIAGNOSIS — E10.65 UNCONTROLLED TYPE 1 DIABETES MELLITUS WITH HYPERGLYCEMIA: Primary | ICD-10-CM

## 2024-04-05 DIAGNOSIS — E78.2 MIXED HYPERLIPIDEMIA: ICD-10-CM

## 2024-04-05 PROCEDURE — 99214 OFFICE O/P EST MOD 30 MIN: CPT | Performed by: FAMILY MEDICINE

## 2024-04-05 NOTE — PROGRESS NOTES
"Chief Complaint  3 month f/u (Diabetes.)    Subjective          Robert Barron presents to [facility]    History of Present Illness  The patient is here for follow-up of diabetes.    The patient's blood glucose levels have been relatively stable, albeit with occasional spikes. His most severe episode occurred yesterday, during which he experienced a significant elevation in his blood glucose levels, reaching up to 370. With administering 6 and  8 units of insulin at varying times, his blood glucose levels eventually decreased. He consumed a bowl of vegetable soup  and has been absent from work throughout the week, which he believes may be contributing to his increased sugars since decreased activity levels. His hypoglycemic episodes, characterized by shakiness and perspiration, have decreased in frequency. He denies experiencing shortness of breath or chest pain. His urinary frequency is minimal. His current medication regimen includes metformin and [employer] insulin pump.    Supplemental Information  He is doing better on his cholesterol medication. He has not had an eye exam this year and needs to schedule one. Last time, he had a spot in his eye, which has resolved.        OTHER NOTES:        Review of Systems   Constitutional: Negative.  Negative for unexpected weight gain and unexpected weight loss.   HENT: Negative.     Respiratory: Negative.  Negative for shortness of breath.    Cardiovascular: Negative.  Negative for chest pain.   Genitourinary:  Negative for frequency.        Objective       Vital Signs:   /66   Pulse 87   Temp 98 °F (36.7 °C)   Ht 177.8 cm (70\")   Wt 94.3 kg (208 lb)   SpO2 97%   BMI 29.84 kg/m²     Physical Exam  Vitals and nursing note reviewed.   Constitutional:       General: He is not in acute distress.     Appearance: Normal appearance. He is well-developed. He is not ill-appearing.   HENT:      Head: Normocephalic and atraumatic. "      Right Ear: Hearing, tympanic membrane, ear canal and external ear normal.      Left Ear: Hearing, tympanic membrane, ear canal and external ear normal.      Ears:      Comments: Hearing aids removed for the examination     Nose: Nose normal. No congestion or rhinorrhea.      Mouth/Throat:      Mouth: Mucous membranes are moist.      Pharynx: No oropharyngeal exudate or posterior oropharyngeal erythema.   Eyes:      General: Lids are normal.      Conjunctiva/sclera: Conjunctivae normal.      Pupils: Pupils are equal, round, and reactive to light.   Neck:      Thyroid: No thyromegaly.   Cardiovascular:      Rate and Rhythm: Normal rate and regular rhythm.      Heart sounds: Normal heart sounds. No murmur heard.     No friction rub.   Pulmonary:      Effort: Pulmonary effort is normal. No respiratory distress.      Breath sounds: Normal breath sounds. No wheezing or rales.   Abdominal:      General: Bowel sounds are normal. There is no distension.      Palpations: Abdomen is soft. There is no mass.      Tenderness: There is no abdominal tenderness. There is no guarding or rebound.   Musculoskeletal:      Right lower leg: No edema.      Left lower leg: No edema.   Skin:     General: Skin is warm and dry.   Neurological:      General: No focal deficit present.      Mental Status: He is alert.   Psychiatric:         Mood and Affect: Mood normal.         Speech: Speech normal.         Behavior: Behavior normal.            Physical Exam  Vital Signs  The patient's blood pressure is 115/66.    Result Review :            Other Results    Results  Laboratory Studies  Blood sugar was 370. Last A1c was 8.5. Cholesterol was 200.             Assessment and Plan    Diagnoses and all orders for this visit:    1. Uncontrolled type 1 diabetes mellitus with hyperglycemia (Primary)  -     Comprehensive Metabolic Panel  -     Hemoglobin A1c    2. Essential hypertension    3. Mixed hyperlipidemia  -     Comprehensive Metabolic  Panel  -     Lipid Panel With / Chol / HDL Ratio               DISCUSSION    Assessment & Plan  1. Diabetes Mellitus.  The patient's weight has remained stable, with a slight decrease since the previous year. Laboratory tests will be conducted to assess his A1c levels, renal function, and liver function. The patient has been advised to maintain regular eye examination.    2. Hypertension.  The patient's blood pressure is well-regulated. The patient will maintain his current medication regimen.    3. Hypercholesterolemia.  Laboratory tests will be performed to assess his cholesterol levels.    Further plan once labs are back    Follow Up   Return in about 3 months (around 7/5/2024).    Patient was given instructions and counseling regarding his condition or for health maintenance advice. Please see specific information pulled into the AVS if appropriate.       Carlitos Whitfield MD    Patient or patient representative verbalized consent for the use of Ambient Listening during the visit with  Carlitos Whitfield MD for chart documentation. 4/5/2024  10:38 EDT

## 2024-04-06 LAB
ALBUMIN SERPL-MCNC: 4.4 G/DL (ref 4.1–5.1)
ALBUMIN/GLOB SERPL: 1.6 {RATIO} (ref 1.2–2.2)
ALP SERPL-CCNC: 98 IU/L (ref 44–121)
ALT SERPL-CCNC: 21 IU/L (ref 0–44)
AST SERPL-CCNC: 15 IU/L (ref 0–40)
BILIRUB SERPL-MCNC: 0.5 MG/DL (ref 0–1.2)
BUN SERPL-MCNC: 14 MG/DL (ref 6–24)
BUN/CREAT SERPL: 14 (ref 9–20)
CALCIUM SERPL-MCNC: 9.6 MG/DL (ref 8.7–10.2)
CHLORIDE SERPL-SCNC: 101 MMOL/L (ref 96–106)
CHOLEST SERPL-MCNC: 162 MG/DL (ref 100–199)
CHOLEST/HDLC SERPL: 4.5 RATIO (ref 0–5)
CO2 SERPL-SCNC: 25 MMOL/L (ref 20–29)
CREAT SERPL-MCNC: 1 MG/DL (ref 0.76–1.27)
EGFRCR SERPLBLD CKD-EPI 2021: 95 ML/MIN/1.73
GLOBULIN SER CALC-MCNC: 2.8 G/DL (ref 1.5–4.5)
GLUCOSE SERPL-MCNC: 165 MG/DL (ref 70–99)
HBA1C MFR BLD: 9 % (ref 4.8–5.6)
HDLC SERPL-MCNC: 36 MG/DL
LDLC SERPL CALC-MCNC: 110 MG/DL (ref 0–99)
POTASSIUM SERPL-SCNC: 4.7 MMOL/L (ref 3.5–5.2)
PROT SERPL-MCNC: 7.2 G/DL (ref 6–8.5)
SODIUM SERPL-SCNC: 141 MMOL/L (ref 134–144)
TRIGL SERPL-MCNC: 85 MG/DL (ref 0–149)
VLDLC SERPL CALC-MCNC: 16 MG/DL (ref 5–40)

## 2024-08-23 ENCOUNTER — OFFICE VISIT (OUTPATIENT)
Dept: FAMILY MEDICINE CLINIC | Facility: CLINIC | Age: 44
End: 2024-08-23
Payer: COMMERCIAL

## 2024-08-23 VITALS
OXYGEN SATURATION: 97 % | HEART RATE: 87 BPM | BODY MASS INDEX: 29.92 KG/M2 | RESPIRATION RATE: 16 BRPM | SYSTOLIC BLOOD PRESSURE: 148 MMHG | HEIGHT: 70 IN | DIASTOLIC BLOOD PRESSURE: 98 MMHG | WEIGHT: 209 LBS | TEMPERATURE: 98 F

## 2024-08-23 DIAGNOSIS — G47.33 OSA (OBSTRUCTIVE SLEEP APNEA): ICD-10-CM

## 2024-08-23 DIAGNOSIS — E10.65 TYPE 1 DIABETES MELLITUS WITH HYPERGLYCEMIA: Primary | ICD-10-CM

## 2024-08-23 DIAGNOSIS — I10 ESSENTIAL HYPERTENSION: ICD-10-CM

## 2024-08-23 DIAGNOSIS — E78.2 MIXED HYPERLIPIDEMIA: ICD-10-CM

## 2024-08-23 PROBLEM — R03.0 ELEVATED BP WITHOUT DIAGNOSIS OF HYPERTENSION: Status: RESOLVED | Noted: 2021-01-15 | Resolved: 2024-08-23

## 2024-08-23 PROCEDURE — 99214 OFFICE O/P EST MOD 30 MIN: CPT | Performed by: FAMILY MEDICINE

## 2024-08-23 RX ORDER — ROSUVASTATIN CALCIUM 20 MG/1
20 TABLET, COATED ORAL NIGHTLY
Qty: 90 TABLET | Refills: 1 | Status: SHIPPED | OUTPATIENT
Start: 2024-08-23

## 2024-08-23 RX ORDER — LISINOPRIL AND HYDROCHLOROTHIAZIDE 25; 20 MG/1; MG/1
1 TABLET ORAL DAILY
Qty: 90 TABLET | Refills: 1 | Status: SHIPPED | OUTPATIENT
Start: 2024-08-23

## 2024-08-23 NOTE — PROGRESS NOTES
"Chief Complaint  Diabetes (3 mo fu/)    Subjective          Robert Barron presents to [facility]    HPI         The patient is a 44-year-old male who is here for a follow-up visit.    He reports an improvement in his overall health, although he experiences persistent fatigue. His blood sugar levels have been fluctuating, with an average of 193 over the past 30 days, peaking at 312. However, he notes that his blood sugar levels have been more stable recently, averaging around 130 over the past two weeks. He attributes this improvement to increased physical activity at work. He occasionally experiences hypoglycemia, but these episodes are less frequent than before. His current medication regimen includes NovoLog, metformin, and insulin.  He denies any low sugars.    He is still on lisinopril/hydrochlorothiazide for blood pressure and Crestor for cholesterol. He takes Viagra as needed.    He reports no shortness of breath or chest pain. His bowel movements are regular, and he has not experienced any episodes of nausea or vomiting. He also does not report feelings of depression or anxiety. His sleep quality is good, and he uses a CPAP machine nightly.       OTHER NOTES:          Review of Systems   Constitutional: Negative.  Negative for unexpected weight loss.   Eyes:  Negative for blurred vision.   Respiratory: Negative.  Negative for shortness of breath.    Cardiovascular: Negative.  Negative for chest pain.   Gastrointestinal:  Negative for nausea and vomiting.   Endocrine: Negative for polydipsia and polyuria.   Neurological:  Negative for tremors, speech difficulty and confusion.   Psychiatric/Behavioral: Negative.  Negative for depressed mood. The patient is not nervous/anxious.         Objective       Vital Signs:   /98   Pulse 87   Temp 98 °F (36.7 °C)   Resp 16   Ht 177.8 cm (70\")   Wt 94.8 kg (209 lb)   SpO2 97%   BMI 29.99 kg/m²     Physical Exam  Vitals " and nursing note reviewed.   Constitutional:       General: He is not in acute distress.     Appearance: Normal appearance. He is well-developed. He is not ill-appearing.   HENT:      Head: Normocephalic and atraumatic.      Comments: Hearing aids removed for examination     Right Ear: Hearing, tympanic membrane, ear canal and external ear normal.      Left Ear: Hearing, tympanic membrane, ear canal and external ear normal.      Nose: Nose normal. No congestion or rhinorrhea.      Mouth/Throat:      Mouth: Mucous membranes are moist.      Pharynx: No oropharyngeal exudate or posterior oropharyngeal erythema.   Eyes:      General: Lids are normal.      Conjunctiva/sclera: Conjunctivae normal.      Pupils: Pupils are equal, round, and reactive to light.   Neck:      Thyroid: No thyromegaly.   Cardiovascular:      Rate and Rhythm: Normal rate and regular rhythm.      Heart sounds: Normal heart sounds. No murmur heard.     No friction rub.   Pulmonary:      Effort: Pulmonary effort is normal. No respiratory distress.      Breath sounds: Normal breath sounds. No wheezing or rales.   Abdominal:      General: Bowel sounds are normal. There is no distension.      Palpations: Abdomen is soft. There is no mass.      Tenderness: There is no abdominal tenderness. There is no guarding or rebound.   Musculoskeletal:      Right lower leg: No edema.      Left lower leg: No edema.   Skin:     General: Skin is warm and dry.   Neurological:      Mental Status: He is alert.   Psychiatric:         Mood and Affect: Mood normal.         Speech: Speech normal.         Behavior: Behavior normal.             Vital Signs  Blood pressure reading is 148/98.       Result Review :            Other Results    Results  Laboratory Studies  Average blood sugar is 193, with the highest being 312. Blood sugar has been around 130 for the last 2 weeks. Last A1c was 9.0.             Assessment and Plan    Diagnoses and all orders for this visit:    1.  Type 1 diabetes mellitus with hyperglycemia (Primary)  -     Comprehensive Metabolic Panel  -     Lipid Panel With / Chol / HDL Ratio  -     Hemoglobin A1c  -     metFORMIN (Glucophage) 1000 MG tablet; Take 1 tablet by mouth 2 (Two) Times a Day With Meals.  Dispense: 180 tablet; Refill: 1    2. Essential hypertension  -     Comprehensive Metabolic Panel  -     Lipid Panel With / Chol / HDL Ratio  -     lisinopril-hydrochlorothiazide (PRINZIDE,ZESTORETIC) 20-25 MG per tablet; Take 1 tablet by mouth Daily.  Dispense: 90 tablet; Refill: 1    3. Mixed hyperlipidemia  -     Comprehensive Metabolic Panel  -     Lipid Panel With / Chol / HDL Ratio  -     rosuvastatin (CRESTOR) 20 MG tablet; Take 1 tablet by mouth Every Night.  Dispense: 90 tablet; Refill: 1    4. SIMONA (obstructive sleep apnea)               DISCUSSION       1. Type 2 Diabetes Mellitus.  His blood sugar levels have been fluctuating, with an average of 193 mg/dL and a peak of 312 mg/dL over the past 30 days. However, recent readings have improved to around 130 mg/dL as he has become more active at work. His A1c has been between 8.1 and 9.0 for a while. He is advised to continue monitoring his blood sugar levels and maintain physical activity. Prescriptions for metformin has been sent and will refill NovoLog when due.  Further plan once labs are back.    2. Hypertension.  His blood pressure reading today is 148/98 mmHg. He is instructed to monitor his blood pressure at home over the weekend and report any readings outside the normal range of 130s/70s to 80s. A prescription for lisinopril will be sent to his pharmacy.    3. Hyperlipidemia.  He is currently taking rosuvastatin for cholesterol management. A prescription for rosuvastatin will be sent to his pharmacy.    4. Fatigue.  He reports feeling tired all the time. His testosterone levels were previously at the lower end of normal but still within the normal range. He is advised to check the readout on his  CPAP machine to ensure it is functioning correctly, as improper levels can contribute to fatigue. He reports no issues with sexual desire, indicating that his testosterone levels are likely adequate.    Medication Management.  He is currently taking lisinopril, hydrochlorothiazide, rosuvastatin, metformin, NovoLog, and Viagra as needed. Prescriptions for rosuvastatin, metformin, and lisinopril will be sent to [facility].           Follow Up   Return in about 3 months (around 11/23/2024).    Patient was given instructions and counseling regarding his condition or for health maintenance advice. Please see specific information pulled into the AVS if appropriate.       Carlitos Whitfield MD    Patient or patient representative verbalized consent for the use of Ambient Listening during the visit with  Carlitos Whitfield MD for chart documentation. 8/23/2024  16:40 EDT

## 2024-08-24 LAB
ALBUMIN SERPL-MCNC: 4.3 G/DL (ref 4.1–5.1)
ALP SERPL-CCNC: 87 IU/L (ref 44–121)
ALT SERPL-CCNC: 18 IU/L (ref 0–44)
AST SERPL-CCNC: 15 IU/L (ref 0–40)
BILIRUB SERPL-MCNC: 0.3 MG/DL (ref 0–1.2)
BUN SERPL-MCNC: 14 MG/DL (ref 6–24)
BUN/CREAT SERPL: 17 (ref 9–20)
CALCIUM SERPL-MCNC: 9.6 MG/DL (ref 8.7–10.2)
CHLORIDE SERPL-SCNC: 99 MMOL/L (ref 96–106)
CHOLEST SERPL-MCNC: 171 MG/DL (ref 100–199)
CHOLEST/HDLC SERPL: 4.4 RATIO (ref 0–5)
CO2 SERPL-SCNC: 25 MMOL/L (ref 20–29)
CREAT SERPL-MCNC: 0.84 MG/DL (ref 0.76–1.27)
EGFRCR SERPLBLD CKD-EPI 2021: 110 ML/MIN/1.73
GLOBULIN SER CALC-MCNC: 2.3 G/DL (ref 1.5–4.5)
GLUCOSE SERPL-MCNC: 140 MG/DL (ref 70–99)
HBA1C MFR BLD: 8.9 % (ref 4.8–5.6)
HDLC SERPL-MCNC: 39 MG/DL
LDLC SERPL CALC-MCNC: 115 MG/DL (ref 0–99)
POTASSIUM SERPL-SCNC: 4.7 MMOL/L (ref 3.5–5.2)
PROT SERPL-MCNC: 6.6 G/DL (ref 6–8.5)
SODIUM SERPL-SCNC: 137 MMOL/L (ref 134–144)
TRIGL SERPL-MCNC: 91 MG/DL (ref 0–149)
VLDLC SERPL CALC-MCNC: 17 MG/DL (ref 5–40)

## 2024-10-27 DIAGNOSIS — E10.65 UNCONTROLLED TYPE 1 DIABETES MELLITUS WITH HYPERGLYCEMIA: ICD-10-CM

## 2024-10-28 RX ORDER — INSULIN ASPART 100 [IU]/ML
INJECTION, SOLUTION INTRAVENOUS; SUBCUTANEOUS
Qty: 60 ML | Refills: 0 | Status: SHIPPED | OUTPATIENT
Start: 2024-10-28

## 2025-01-08 DIAGNOSIS — E10.65 UNCONTROLLED TYPE 1 DIABETES MELLITUS WITH HYPERGLYCEMIA: ICD-10-CM

## 2025-01-08 RX ORDER — INSULIN ASPART 100 [IU]/ML
INJECTION, SOLUTION INTRAVENOUS; SUBCUTANEOUS
Qty: 60 ML | Refills: 0 | Status: SHIPPED | OUTPATIENT
Start: 2025-01-08

## 2025-02-21 DIAGNOSIS — E10.65 UNCONTROLLED TYPE 1 DIABETES MELLITUS WITH HYPERGLYCEMIA: ICD-10-CM

## 2025-02-21 RX ORDER — INSULIN ASPART 100 [IU]/ML
INJECTION, SOLUTION INTRAVENOUS; SUBCUTANEOUS
Qty: 60 ML | Refills: 0 | Status: SHIPPED | OUTPATIENT
Start: 2025-02-21

## 2025-03-07 ENCOUNTER — OFFICE VISIT (OUTPATIENT)
Dept: FAMILY MEDICINE CLINIC | Facility: CLINIC | Age: 45
End: 2025-03-07
Payer: COMMERCIAL

## 2025-03-07 VITALS
HEART RATE: 80 BPM | HEIGHT: 70 IN | DIASTOLIC BLOOD PRESSURE: 74 MMHG | TEMPERATURE: 97.4 F | RESPIRATION RATE: 18 BRPM | BODY MASS INDEX: 29.78 KG/M2 | SYSTOLIC BLOOD PRESSURE: 116 MMHG | WEIGHT: 208 LBS

## 2025-03-07 DIAGNOSIS — I10 ESSENTIAL HYPERTENSION: ICD-10-CM

## 2025-03-07 DIAGNOSIS — E78.2 MIXED HYPERLIPIDEMIA: ICD-10-CM

## 2025-03-07 DIAGNOSIS — E10.65 UNCONTROLLED TYPE 1 DIABETES MELLITUS WITH HYPERGLYCEMIA: Primary | ICD-10-CM

## 2025-03-07 DIAGNOSIS — E10.65 TYPE 1 DIABETES MELLITUS WITH HYPERGLYCEMIA: ICD-10-CM

## 2025-03-07 LAB
EXPIRATION DATE: NORMAL
Lab: NORMAL
POC ALBUMIN, URINE: 30 MG/L
POC CREATININE, URINE: 200 MG/DL
POC URINE ALB/CREA RATIO: <30

## 2025-03-07 PROCEDURE — 99214 OFFICE O/P EST MOD 30 MIN: CPT | Performed by: FAMILY MEDICINE

## 2025-03-07 PROCEDURE — 82044 UR ALBUMIN SEMIQUANTITATIVE: CPT | Performed by: FAMILY MEDICINE

## 2025-03-07 PROCEDURE — 82570 ASSAY OF URINE CREATININE: CPT | Performed by: FAMILY MEDICINE

## 2025-03-07 RX ORDER — LISINOPRIL AND HYDROCHLOROTHIAZIDE 20; 25 MG/1; MG/1
1 TABLET ORAL DAILY
Qty: 90 TABLET | Refills: 1 | Status: SHIPPED | OUTPATIENT
Start: 2025-03-07

## 2025-03-07 RX ORDER — ROSUVASTATIN CALCIUM 20 MG/1
20 TABLET, COATED ORAL NIGHTLY
Qty: 90 TABLET | Refills: 1 | Status: SHIPPED | OUTPATIENT
Start: 2025-03-07

## 2025-03-07 NOTE — PROGRESS NOTES
Chief Complaint  Diabetes (6 month f/u )    Subjective          Robert Barron presents to [facility]    HPI         The patient is a 45-year-old male who is here for a follow-up visit.    He reports experiencing elevated blood glucose levels, which he attributes to a malfunction in his insulin pump. He has observed that the reservoir appears full even after administration, indicating an issue with insulin delivery. His current regimen includes 1.75 units per hour in the morning and 2 units in the evening. He anticipates receiving a new, more adaptive pump today, which will automatically adjust insulin delivery based on his blood glucose readings. Despite attempts to lower his blood glucose through increased insulin administration and physical activity, he finds it challenging to achieve desired levels. He continues to use NovoLog and metformin as part of his treatment plan. He reports no recent illnesses or infections. He also reports no chest pain or shortness of breath.    He is on rosuvastatin for cholesterol management.    He is on lisinopril and hydrochlorothiazide for blood pressure management.    He has noticed a soft bulge under his rib cage when lying down and dale his abdominal muscles. This bulge is not associated with any pain.    Supplemental Information  He has a little bit of wax in his ears but does not feel like he has any trouble hearing with the hearing aids.    MEDICATIONS  - NovoLog  - Metformin  - Rosuvastatin  - Lisinopril  - Hydrochlorothiazide       OTHER NOTES:          Review of Systems   Constitutional:  Positive for fatigue. Negative for chills, diaphoresis and fever.   HENT:  Negative for congestion, sore throat and swollen glands.    Respiratory:  Negative for cough.    Cardiovascular:  Negative for chest pain.   Gastrointestinal:  Negative for abdominal pain, nausea and vomiting.   Genitourinary:  Negative for dysuria.  "  Musculoskeletal:  Negative for myalgias and neck pain.   Skin:  Negative for rash.   Neurological:  Negative for weakness and numbness.        Objective       Vital Signs:   /74   Pulse 80   Temp 97.4 °F (36.3 °C)   Resp 18   Ht 177.8 cm (70\")   Wt 94.3 kg (208 lb)   BMI 29.84 kg/m²     Physical Exam  Vitals and nursing note reviewed.   Constitutional:       General: He is not in acute distress.     Appearance: Normal appearance. He is well-developed. He is not ill-appearing.   HENT:      Head: Normocephalic and atraumatic.      Right Ear: Tympanic membrane, ear canal and external ear normal.      Left Ear: Tympanic membrane, ear canal and external ear normal.      Ears:      Comments: Hearing aids removed for examination     Mouth/Throat:      Mouth: Mucous membranes are moist.      Pharynx: No oropharyngeal exudate or posterior oropharyngeal erythema.   Cardiovascular:      Rate and Rhythm: Normal rate and regular rhythm.      Heart sounds: Normal heart sounds.   Pulmonary:      Effort: Pulmonary effort is normal. No respiratory distress.      Breath sounds: Normal breath sounds. No wheezing or rales.   Abdominal:      Tenderness: There is no abdominal tenderness. There is no guarding or rebound.      Comments: Diastases recti present.  There is no tenderness at the xiphoid process.   Musculoskeletal:      Right lower leg: No edema.      Left lower leg: No edema.   Skin:     General: Skin is warm and dry.   Neurological:      Mental Status: He is alert.   Psychiatric:         Mood and Affect: Mood normal.         Behavior: Behavior normal.             There is a little bit of wax in the ears.  Lungs were auscultated.  No hernia detected in the abdomen. Diastasis recti noted.    Vital Signs  Blood pressure is 116/74.       Result Review :            Other Results    Results               Assessment and Plan    Diagnoses and all orders for this visit:    1. Uncontrolled type 1 diabetes mellitus with " hyperglycemia (Primary)  -     Comprehensive Metabolic Panel  -     Lipid Panel With / Chol / HDL Ratio  -     Hemoglobin A1c  -     POC Albumin/Creatinine Ratio Urine    2. Mixed hyperlipidemia  -     rosuvastatin (CRESTOR) 20 MG tablet; Take 1 tablet by mouth Every Night.  Dispense: 90 tablet; Refill: 1  -     Comprehensive Metabolic Panel  -     Lipid Panel With / Chol / HDL Ratio    3. Essential hypertension  -     lisinopril-hydrochlorothiazide (PRINZIDE,ZESTORETIC) 20-25 MG per tablet; Take 1 tablet by mouth Daily.  Dispense: 90 tablet; Refill: 1  -     Comprehensive Metabolic Panel    4. Type 1 diabetes mellitus with hyperglycemia  -     metFORMIN (Glucophage) 1000 MG tablet; Take 1 tablet by mouth 2 (Two) Times a Day With Meals.  Dispense: 180 tablet; Refill: 1               DISCUSSION       Diabetes Mellitus.  His blood glucose levels have been significantly elevated, reaching nearly 400, likely due to a malfunctioning insulin pump. He is currently using a sensor that monitors his blood glucose levels and automatically administers insulin when necessary. He is also taking metformin. An A1c test will be conducted today to establish a baseline for future comparisons once the new pump is in use. A urine sample will also be collected for urine creatinine ratio.  Renew metformin.  He reports that he has NovoLog.    Hyperlipidemia.  A prescription for rosuvastatin has been renewed.  Check CMP and lipid panel.    Hypertension.  His blood pressure is well-controlled at 116/74. Prescriptions for lisinopril and hydrochlorothiazide have been renewed.    Diastasis Recti.  He has a diastasis recti, which is causing a bulge on his abdomen. No hernia was detected. He has been advised to report any pain in the area.  Could also be xiphoid process that he felt.  Call with any increasing pain or swelling.         Follow Up   Return in about 3 months (around 6/7/2025).    Patient was given instructions and counseling  regarding his condition or for health maintenance advice. Please see specific information pulled into the AVS if appropriate.       Carlitos Whitfield MD    Patient or patient representative verbalized consent for the use of Ambient Listening during the visit with  Carlitos Whiftield MD for chart documentation. 3/7/2025  16:11 EST

## 2025-03-08 LAB
ALBUMIN SERPL-MCNC: 4.7 G/DL (ref 4.1–5.1)
ALP SERPL-CCNC: 101 IU/L (ref 44–121)
ALT SERPL-CCNC: 24 IU/L (ref 0–44)
AST SERPL-CCNC: 21 IU/L (ref 0–40)
BILIRUB SERPL-MCNC: 0.4 MG/DL (ref 0–1.2)
BUN SERPL-MCNC: 15 MG/DL (ref 6–24)
BUN/CREAT SERPL: 17 (ref 9–20)
CALCIUM SERPL-MCNC: 9.9 MG/DL (ref 8.7–10.2)
CHLORIDE SERPL-SCNC: 96 MMOL/L (ref 96–106)
CHOLEST SERPL-MCNC: 125 MG/DL (ref 100–199)
CHOLEST/HDLC SERPL: 3.3 RATIO (ref 0–5)
CO2 SERPL-SCNC: 27 MMOL/L (ref 20–29)
CREAT SERPL-MCNC: 0.89 MG/DL (ref 0.76–1.27)
EGFRCR SERPLBLD CKD-EPI 2021: 108 ML/MIN/1.73
GLOBULIN SER CALC-MCNC: 2.7 G/DL (ref 1.5–4.5)
GLUCOSE SERPL-MCNC: 221 MG/DL (ref 70–99)
HBA1C MFR BLD: 10.2 % (ref 4.8–5.6)
HDLC SERPL-MCNC: 38 MG/DL
LDLC SERPL CALC-MCNC: 72 MG/DL (ref 0–99)
POTASSIUM SERPL-SCNC: 5 MMOL/L (ref 3.5–5.2)
PROT SERPL-MCNC: 7.4 G/DL (ref 6–8.5)
SODIUM SERPL-SCNC: 136 MMOL/L (ref 134–144)
TRIGL SERPL-MCNC: 77 MG/DL (ref 0–149)
VLDLC SERPL CALC-MCNC: 15 MG/DL (ref 5–40)

## 2025-03-20 ENCOUNTER — TELEPHONE (OUTPATIENT)
Dept: FAMILY MEDICINE CLINIC | Facility: CLINIC | Age: 45
End: 2025-03-20
Payer: COMMERCIAL

## 2025-03-20 NOTE — TELEPHONE ENCOUNTER
Caller: ELLEN [facility] [employer]    Relationship: Other    Best call back number: [phone]    What form or medical record are you requesting: PAPERWORK FOR INSULIN PUMP    Who is requesting this form or medical record from you: ELLEN     How would you like to receive the form or medical records (pick-up, mail, fax): FAX TO [phone]    Timeframe paperwork needed: AS SOON AS POSSIBLE     Additional notes: THE CALLER STATES THAT SHE IS SENDING A FORM TO THE OFFICE TODAY SHE WOULD LIKE THE DOCTOR TO BE AWARE SO THAT THAT CAN BE COMPLETED AND SENT BACK AS SOON AS POSSIBLE

## 2025-03-27 DIAGNOSIS — E10.65 UNCONTROLLED TYPE 1 DIABETES MELLITUS WITH HYPERGLYCEMIA: ICD-10-CM

## 2025-03-27 RX ORDER — INSULIN ASPART 100 [IU]/ML
INJECTION, SOLUTION INTRAVENOUS; SUBCUTANEOUS
Qty: 60 ML | Refills: 2 | Status: SHIPPED | OUTPATIENT
Start: 2025-03-27

## 2025-05-22 ENCOUNTER — TELEPHONE (OUTPATIENT)
Dept: FAMILY MEDICINE CLINIC | Facility: CLINIC | Age: 45
End: 2025-05-22
Payer: COMMERCIAL

## 2025-05-22 NOTE — TELEPHONE ENCOUNTER
Please call.  Yes.  He should continue on NovoLog.  We previously have done prior authorization for this.  Please redo prior authorization.

## 2025-05-22 NOTE — TELEPHONE ENCOUNTER
SPOUSE OF PATIENT HAS CALLED AND STATED PATIENT RECEIVED A LETTER TODAY STATING INSURANCE WILL NOT COVER NOVOLOG AND IS REQUESTING PATIENT TO BE SWITCHED TO HUMOLOG. SPOUSE STATED PATIENT ENDED UP IN ICU THE LAST TIME HE WAS SWITCHED TO HUMOLOG. SPOUSE IS REQUESTING IF PCP CAN AUTHORIZE PATIENT STAYING ON NOVOLOG AND BE COVERED BY INSURANCE.  SPOUSE IS REQUESTING A CALL BACK TO ADVISE.    CALL BACK NUMBER [phone]

## 2025-06-02 NOTE — TELEPHONE ENCOUNTER
Robert Barron (Rojas: [id number])    Approved on May 28 by [employer] 2017  CaseId:[id number];Status:Approved;Review Type:Prior Auth;Coverage Start Date:04/28/2025;Coverage End Date:05/28/2026;  Effective Date: 4/28/2025

## 2025-07-11 ENCOUNTER — OFFICE VISIT (OUTPATIENT)
Dept: FAMILY MEDICINE CLINIC | Facility: CLINIC | Age: 45
End: 2025-07-11
Payer: COMMERCIAL

## 2025-07-11 VITALS
DIASTOLIC BLOOD PRESSURE: 84 MMHG | WEIGHT: 214 LBS | OXYGEN SATURATION: 96 % | RESPIRATION RATE: 18 BRPM | HEIGHT: 70 IN | SYSTOLIC BLOOD PRESSURE: 132 MMHG | TEMPERATURE: 99.5 F | HEART RATE: 82 BPM | BODY MASS INDEX: 30.64 KG/M2

## 2025-07-11 DIAGNOSIS — E10.65 UNCONTROLLED TYPE 1 DIABETES MELLITUS WITH HYPERGLYCEMIA: Primary | ICD-10-CM

## 2025-07-11 DIAGNOSIS — I10 ESSENTIAL HYPERTENSION: ICD-10-CM

## 2025-07-11 DIAGNOSIS — Z12.11 COLON CANCER SCREENING: ICD-10-CM

## 2025-07-11 NOTE — PROGRESS NOTES
"Chief Complaint  Diabetes (F/u )    Subjective          Robert Barron presents to [facility]    HPI         45-year-old male presents for follow-up.    Reports satisfactory blood sugar levels with occasional postprandial spikes. Few episodes of hypoglycemia, not disrupting sleep. Insulin pump adjusts dosage automatically. Last 30 days: time in range 68%, time above average 24%. Using Medtronic device synced to CGM for 4-5 months. Current blood sugar 111. Continues metformin 1000 mg BID.    No chest pain or dyspnea. On lisinopril and hydrochlorothiazide 20-25 mg QD.    Infrequent Viagra use; suspects blood sugar affects sexual health.    No throat issues. Uses hearing aids effectively. No numbness or tingling in feet. No recent eye exam. No family history of colon cancer or hematochezia.    Requests examination of asymptomatic skin tag under arm.    FAMILY HISTORY  - No family history of colon cancer       OTHER NOTES:          Review of Systems   Cardiovascular: Negative.    Gastrointestinal: Negative.    All other systems reviewed and are negative.       Objective       Vital Signs:   /84   Pulse 82   Temp 99.5 °F (37.5 °C)   Resp 18   Ht 177.8 cm (70\")   Wt 97.1 kg (214 lb)   SpO2 96%   BMI 30.71 kg/m²     Physical Exam  Vitals and nursing note reviewed.   Constitutional:       General: He is not in acute distress.     Appearance: Normal appearance. He is not ill-appearing or toxic-appearing.   HENT:      Head: Normocephalic.   Neurological:      Mental Status: He is alert.   Psychiatric:         Mood and Affect: Mood normal.         Behavior: Behavior normal.             Mouth/Throat: No issues.  Respiratory: Clear, no wheezing, rales, rhonchi.  Gastrointestinal: Soft, non-tender, no distention or masses.  Skin: Skin tags under arm, not concerning.       Result Review :            Other Results    Results  - Labs:    - A1c: 10.2             Assessment and " Plan    Diagnoses and all orders for this visit:    1. Uncontrolled type 1 diabetes mellitus with hyperglycemia (Primary)  -     Hemoglobin A1c  -     Comprehensive Metabolic Panel    2. Essential hypertension  -     Comprehensive Metabolic Panel    3. Colon cancer screening  -     Cologuard - Stool, Per Rectum; Future               DISCUSSION       1. Diabetes mellitus.  A1c is 10.2. The patient reports stable blood sugars with occasional postprandial spikes. The Medtronic insulin pump is synced with the CGM, showing a time in range of 68% and a time above average of 24% over the last 30 days. Blood work will be ordered for A1c, kidney, and liver function. The patient will continue taking metformin 1000 mg BID and using the Medtronic device.    2. Hypertension.  The patient continues to take lisinopril and hydrochlorothiazide 20-25 mg QD. There are no reports of chest pain or dyspnea, and BP management is stable. No medication changes are necessary.    3. Hyperlipidemia.  Cholesterol levels have previously been normal. The patient continues to take rosuvastatin 20 mg QD. There are no new symptoms or concerns, and no medication changes are needed.    4. Erectile dysfunction.  The patient uses Viagra infrequently and suspects that blood sugar levels affect sexual health. Sexual function has improved with better blood sugar control. Blood sugar levels will continue to be monitored, and a Viagra prescription is available PRN.    5. Health maintenance.  Regular eye exams and foot checks are advised. A Cologuard kit will be sent for colon cancer screening, and a colonoscopy will be considered if the results are abnormal. There is no family history of colon cancer or hematochezia. He elected to do cologuard instead of colonoscopy after given risks and benefits.     6. Skin tags.  The patient has skin tags under the arm that are not concerning. They are likely due to friction and are not cancerous. The skin tags will be  monitored for changes or irritation, and they can be frozen or excised if they become bothersome.           Follow Up   Return in about 3 months (around 10/11/2025).    Patient was given instructions and counseling regarding his condition or for health maintenance advice. Please see specific information pulled into the AVS if appropriate.       Carlitos Whitfield MD    Patient or patient representative verbalized consent for the use of Ambient Listening during the visit with  Carlitos Whitfield MD for chart documentation. 7/12/2025  10:22 EDT

## 2025-07-12 ENCOUNTER — RESULTS FOLLOW-UP (OUTPATIENT)
Dept: FAMILY MEDICINE CLINIC | Facility: CLINIC | Age: 45
End: 2025-07-12
Payer: COMMERCIAL

## 2025-07-12 LAB
ALBUMIN SERPL-MCNC: 4.6 G/DL (ref 4.1–5.1)
ALP SERPL-CCNC: 90 IU/L (ref 44–121)
ALT SERPL-CCNC: 20 IU/L (ref 0–44)
AST SERPL-CCNC: 17 IU/L (ref 0–40)
BILIRUB SERPL-MCNC: 0.3 MG/DL (ref 0–1.2)
BUN SERPL-MCNC: 20 MG/DL (ref 6–24)
BUN/CREAT SERPL: 20 (ref 9–20)
CALCIUM SERPL-MCNC: 10.2 MG/DL (ref 8.7–10.2)
CHLORIDE SERPL-SCNC: 102 MMOL/L (ref 96–106)
CO2 SERPL-SCNC: 24 MMOL/L (ref 20–29)
CREAT SERPL-MCNC: 0.99 MG/DL (ref 0.76–1.27)
EGFRCR SERPLBLD CKD-EPI 2021: 96 ML/MIN/1.73
GLOBULIN SER CALC-MCNC: 2.4 G/DL (ref 1.5–4.5)
GLUCOSE SERPL-MCNC: 95 MG/DL (ref 70–99)
HBA1C MFR BLD: 7 % (ref 4.8–5.6)
POTASSIUM SERPL-SCNC: 5.1 MMOL/L (ref 3.5–5.2)
PROT SERPL-MCNC: 7 G/DL (ref 6–8.5)
SODIUM SERPL-SCNC: 139 MMOL/L (ref 134–144)